# Patient Record
Sex: FEMALE | Race: WHITE | NOT HISPANIC OR LATINO | URBAN - METROPOLITAN AREA
[De-identification: names, ages, dates, MRNs, and addresses within clinical notes are randomized per-mention and may not be internally consistent; named-entity substitution may affect disease eponyms.]

---

## 2017-03-04 ENCOUNTER — EMERGENCY (EMERGENCY)
Facility: HOSPITAL | Age: 82
LOS: 1 days | Discharge: HOME | End: 2017-03-04
Attending: EMERGENCY MEDICINE | Admitting: EMERGENCY MEDICINE
Payer: MEDICARE

## 2017-03-04 VITALS
TEMPERATURE: 98 F | SYSTOLIC BLOOD PRESSURE: 177 MMHG | HEART RATE: 60 BPM | DIASTOLIC BLOOD PRESSURE: 68 MMHG | RESPIRATION RATE: 18 BRPM | OXYGEN SATURATION: 95 %

## 2017-03-04 DIAGNOSIS — M79.672 PAIN IN LEFT FOOT: ICD-10-CM
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
## 2017-03-04 DIAGNOSIS — S92.352A DISPLACED FRACTURE OF FIFTH METATARSAL BONE, LEFT FOOT, INITIAL ENCOUNTER FOR CLOSED FRACTURE: ICD-10-CM
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
## 2017-03-04 DIAGNOSIS — I10 ESSENTIAL (PRIMARY) HYPERTENSION: ICD-10-CM

## 2017-03-04 DIAGNOSIS — R07.81 PLEURODYNIA: ICD-10-CM

## 2017-03-04 DIAGNOSIS — J45.909 UNSPECIFIED ASTHMA, UNCOMPLICATED: ICD-10-CM
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
## 2017-03-04 DIAGNOSIS — S92.342A DISPLACED FRACTURE OF FOURTH METATARSAL BONE, LEFT FOOT, INITIAL ENCOUNTER FOR CLOSED FRACTURE: ICD-10-CM

## 2017-03-04 PROCEDURE — 73620 X-RAY EXAM OF FOOT: CPT | Mod: 26,LT

## 2017-03-04 PROCEDURE — 99284 EMERGENCY DEPT VISIT MOD MDM: CPT | Mod: 25

## 2017-03-04 PROCEDURE — 73620 X-RAY EXAM OF FOOT: CPT

## 2017-03-04 PROCEDURE — 28470 CLTX METATARSAL FX WO MNP EA: CPT | Mod: LT

## 2017-03-04 PROCEDURE — 70450 CT HEAD/BRAIN W/O DYE: CPT | Mod: 26

## 2017-03-04 PROCEDURE — 99284 EMERGENCY DEPT VISIT MOD MDM: CPT | Mod: GC

## 2017-03-04 PROCEDURE — 70450 CT HEAD/BRAIN W/O DYE: CPT

## 2017-03-04 PROCEDURE — 71250 CT THORAX DX C-: CPT | Mod: 26

## 2017-03-04 PROCEDURE — 71250 CT THORAX DX C-: CPT

## 2017-03-04 RX ORDER — ACETAMINOPHEN 500 MG
650 TABLET ORAL ONCE
Qty: 0 | Refills: 0 | Status: COMPLETED | OUTPATIENT
Start: 2017-03-04 | End: 2017-03-04

## 2017-03-04 RX ADMIN — Medication 650 MILLIGRAM(S): at 11:02

## 2017-03-04 NOTE — ED PROVIDER NOTE - MUSCULOSKELETAL, MLM
Spine appears normal, range of motion is not limited, tender in left lower posterior ribs and left fifth metacarpal

## 2017-03-04 NOTE — ED PROVIDER NOTE - MEDICAL DECISION MAKING DETAILS
89 yo female presenting after fall on AC; unremarkable neuro exam; will obtain ct head chest and xray of foot; DC if no signs of fracture 89 yo female presenting after fall on AC; unremarkable neuro exam; will obtain ct head chest and xray of foot; DC if no signs of fracture  ernesto -  s/p mechan fall yesterdaY no loc - ambulatory - CT HEAD AND CHEST R/O BLEED, R/O OCCULT PTX, RIB FX -- XR FOOT FOR FX, ANALGESIA AND REEEVAL

## 2017-03-04 NOTE — ED PROVIDER NOTE - PROGRESS NOTE DETAILS
initial declined pain meds now accepting tylenol p made aware of fx - discussed CT findings with patient and daughter of patient; notified to follow up with PCP and orthopedic surgeon. called assisted living pt w fx foot non wt bearing arrange for wheelchar and ortho f/u

## 2017-03-04 NOTE — ED PROVIDER NOTE - PLAN OF CARE
Follow up with your medical doctor in 2-3 days in regards to your abnormal finding found on CT scan.  It can be potentially something serious.  Please follow up with an orthopedic surgeon in 5-7 days in regards to your foot fracture.  A list of orthopedic surgeons will be given to you.     Take Tylenol 1g every six hours and supplement with ibuprofen 600mg, with food, every six hours which can be taken three hours apart from the Tylenol to have a layered effect.  Drink at least 2 Liters or 64 Ounces of water each day.  Return for any persistent, worsening symptoms, or ANY concerns at all.

## 2017-03-04 NOTE — ED PROVIDER NOTE - SKIN, MLM
no signs of bruising seen in the chest or back, ecchymosis/bruise seen over the fifth metacarpal of left foot, cap refill <2 seconds, PT pulse palpable

## 2017-03-04 NOTE — ED PROCEDURE NOTE - NS ED PERI VASCULAR NEG
no cyanosis of extremity/no paresthesia/no swelling/fingers/toes warm to touch/capillary refill time < 2 seconds

## 2017-03-04 NOTE — ED PROVIDER NOTE - CARE PLAN
Principal Discharge DX:	Fall Principal Discharge DX:	Fall  Instructions for follow-up, activity and diet:	Follow up with your medical doctor in 2-3 days in regards to your abnormal finding found on CT scan.  It can be potentially something serious.  Please follow up with an orthopedic surgeon in 5-7 days in regards to your foot fracture.  A list of orthopedic surgeons will be given to you.     Take Tylenol 1g every six hours and supplement with ibuprofen 600mg, with food, every six hours which can be taken three hours apart from the Tylenol to have a layered effect.  Drink at least 2 Liters or 64 Ounces of water each day.  Return for any persistent, worsening symptoms, or ANY concerns at all.

## 2017-03-04 NOTE — ED ADULT NURSE NOTE - OBJECTIVE STATEMENT
89 y/o F, reported to ED from assisted living. A&Ox2 (person and place), s/p fall. EMS reports that pt had a witnessed fall last night at 22:00. Pt's daughter reports that she spoke with the pt after the fall and she said that she was fine. Daughter reports that the fall was unwitnessed and that the aide saw the pt getting off of the floor. Pt denies LOC or head trauma. Pt reports that she was walking when she probably tripped over her cane. Pt has a hx of dementia and is unable to recall the exact order of events but stats "I trip and fall frequently." Daughter reports that pt was complaining of some left upper back pain this morning and foot pain. Pt reports some right scapula pain currently. Pt has ecchymosis on the top of her left foot and left elbow. Pt had full ROM in the left foot and arm. Pt was able to walk after the fall and walked from EMS stretcher to hospital bed upon arrival. Pt reports minimal pain at this time. Pt denies SOB, C/P, N/V/D, abd pain, fever or chills. Pt has a hx of dementia, asthma, dyslipidemia and triple bypass 15 years ago. Pt was recently started on Eliquis as per daughter. Daughter at bedside, will continue to monitor pt.

## 2017-03-06 ENCOUNTER — APPOINTMENT (OUTPATIENT)
Dept: ORTHOPEDIC SURGERY | Facility: CLINIC | Age: 82
End: 2017-03-06

## 2017-03-06 PROBLEM — Z00.00 ENCOUNTER FOR PREVENTIVE HEALTH EXAMINATION: Status: ACTIVE | Noted: 2017-03-06

## 2017-03-20 ENCOUNTER — APPOINTMENT (OUTPATIENT)
Dept: ORTHOPEDIC SURGERY | Facility: CLINIC | Age: 82
End: 2017-03-20

## 2017-03-20 DIAGNOSIS — Z87.09 PERSONAL HISTORY OF OTHER DISEASES OF THE RESPIRATORY SYSTEM: ICD-10-CM

## 2017-03-20 DIAGNOSIS — Z78.9 OTHER SPECIFIED HEALTH STATUS: ICD-10-CM

## 2017-04-03 ENCOUNTER — APPOINTMENT (OUTPATIENT)
Dept: ORTHOPEDIC SURGERY | Facility: CLINIC | Age: 82
End: 2017-04-03

## 2017-05-15 ENCOUNTER — APPOINTMENT (OUTPATIENT)
Dept: ORTHOPEDIC SURGERY | Facility: CLINIC | Age: 82
End: 2017-05-15

## 2017-05-15 DIAGNOSIS — S92.345D NONDISPLACED FRACTURE OF FOURTH METATARSAL BONE, LEFT FOOT, SUBSEQUENT ENCOUNTER FOR FRACTURE WITH ROUTINE HEALING: ICD-10-CM

## 2017-05-15 DIAGNOSIS — S92.355D NONDISPLACED FRACTURE OF FIFTH METATARSAL BONE, LEFT FOOT, SUBSEQUENT ENCOUNTER FOR FRACTURE WITH ROUTINE HEALING: ICD-10-CM

## 2017-05-15 RX ORDER — DILTIAZEM HYDROCHLORIDE 240 MG/1
240 CAPSULE, EXTENDED RELEASE ORAL
Qty: 90 | Refills: 0 | Status: ACTIVE | COMMUNITY
Start: 2017-01-26

## 2017-05-15 RX ORDER — DILTIAZEM HYDROCHLORIDE 360 MG/1
360 CAPSULE, COATED, EXTENDED RELEASE ORAL
Qty: 90 | Refills: 0 | Status: ACTIVE | COMMUNITY
Start: 2016-11-28

## 2017-05-15 RX ORDER — APIXABAN 2.5 MG/1
2.5 TABLET, FILM COATED ORAL
Qty: 180 | Refills: 0 | Status: ACTIVE | COMMUNITY
Start: 2017-02-08

## 2017-05-15 RX ORDER — ESCITALOPRAM OXALATE 10 MG/1
10 TABLET ORAL
Qty: 90 | Refills: 0 | Status: ACTIVE | COMMUNITY
Start: 2016-11-28

## 2017-05-15 RX ORDER — TRIAMTERENE AND HYDROCHLOROTHIAZIDE 25; 37.5 MG/1; MG/1
37.5-25 TABLET ORAL
Qty: 15 | Refills: 0 | Status: ACTIVE | COMMUNITY
Start: 2016-11-26

## 2017-05-15 RX ORDER — EZETIMIBE AND SIMVASTATIN 10; 20 MG/1; MG/1
10-20 TABLET ORAL
Qty: 45 | Refills: 0 | Status: ACTIVE | COMMUNITY
Start: 2016-11-28

## 2017-05-15 RX ORDER — LOSARTAN POTASSIUM AND HYDROCHLOROTHIAZIDE 12.5; 1 MG/1; MG/1
100-12.5 TABLET ORAL
Qty: 90 | Refills: 0 | Status: ACTIVE | COMMUNITY
Start: 2016-11-28

## 2018-01-04 ENCOUNTER — EMERGENCY (EMERGENCY)
Facility: HOSPITAL | Age: 83
LOS: 1 days | Discharge: HOME | End: 2018-01-04
Attending: EMERGENCY MEDICINE | Admitting: EMERGENCY MEDICINE
Payer: MEDICARE

## 2018-01-04 VITALS
DIASTOLIC BLOOD PRESSURE: 79 MMHG | RESPIRATION RATE: 63 BRPM | OXYGEN SATURATION: 97 % | SYSTOLIC BLOOD PRESSURE: 173 MMHG

## 2018-01-04 VITALS
OXYGEN SATURATION: 97 % | RESPIRATION RATE: 18 BRPM | SYSTOLIC BLOOD PRESSURE: 209 MMHG | HEART RATE: 63 BPM | DIASTOLIC BLOOD PRESSURE: 62 MMHG

## 2018-01-04 LAB
ALBUMIN SERPL ELPH-MCNC: 4.1 G/DL — SIGNIFICANT CHANGE UP (ref 3.3–5)
ALP SERPL-CCNC: 66 U/L — SIGNIFICANT CHANGE UP (ref 40–120)
ALT FLD-CCNC: 14 U/L RC — SIGNIFICANT CHANGE UP (ref 10–45)
ANION GAP SERPL CALC-SCNC: 9 MMOL/L — SIGNIFICANT CHANGE UP (ref 5–17)
APPEARANCE UR: CLEAR — SIGNIFICANT CHANGE UP
AST SERPL-CCNC: 15 U/L — SIGNIFICANT CHANGE UP (ref 10–40)
BASOPHILS # BLD AUTO: 0.1 K/UL — SIGNIFICANT CHANGE UP (ref 0–0.2)
BASOPHILS NFR BLD AUTO: 0.9 % — SIGNIFICANT CHANGE UP (ref 0–2)
BILIRUB SERPL-MCNC: 0.3 MG/DL — SIGNIFICANT CHANGE UP (ref 0.2–1.2)
BILIRUB UR-MCNC: NEGATIVE — SIGNIFICANT CHANGE UP
BUN SERPL-MCNC: 18 MG/DL — SIGNIFICANT CHANGE UP (ref 7–23)
CALCIUM SERPL-MCNC: 9.5 MG/DL — SIGNIFICANT CHANGE UP (ref 8.4–10.5)
CHLORIDE SERPL-SCNC: 98 MMOL/L — SIGNIFICANT CHANGE UP (ref 96–108)
CO2 SERPL-SCNC: 31 MMOL/L — SIGNIFICANT CHANGE UP (ref 22–31)
COLOR SPEC: YELLOW — SIGNIFICANT CHANGE UP
COMMENT - URINE: SIGNIFICANT CHANGE UP
CREAT SERPL-MCNC: 0.91 MG/DL — SIGNIFICANT CHANGE UP (ref 0.5–1.3)
DIFF PNL FLD: NEGATIVE — SIGNIFICANT CHANGE UP
EOSINOPHIL # BLD AUTO: 0.2 K/UL — SIGNIFICANT CHANGE UP (ref 0–0.5)
EOSINOPHIL NFR BLD AUTO: 2.7 % — SIGNIFICANT CHANGE UP (ref 0–6)
GLUCOSE SERPL-MCNC: 92 MG/DL — SIGNIFICANT CHANGE UP (ref 70–99)
GLUCOSE UR QL: NEGATIVE — SIGNIFICANT CHANGE UP
HCT VFR BLD CALC: 36 % — SIGNIFICANT CHANGE UP (ref 34.5–45)
HGB BLD-MCNC: 12.3 G/DL — SIGNIFICANT CHANGE UP (ref 11.5–15.5)
KETONES UR-MCNC: NEGATIVE — SIGNIFICANT CHANGE UP
LEUKOCYTE ESTERASE UR-ACNC: NEGATIVE — SIGNIFICANT CHANGE UP
LYMPHOCYTES # BLD AUTO: 1.8 K/UL — SIGNIFICANT CHANGE UP (ref 1–3.3)
LYMPHOCYTES # BLD AUTO: 20.4 % — SIGNIFICANT CHANGE UP (ref 13–44)
MCHC RBC-ENTMCNC: 31.7 PG — SIGNIFICANT CHANGE UP (ref 27–34)
MCHC RBC-ENTMCNC: 34.1 GM/DL — SIGNIFICANT CHANGE UP (ref 32–36)
MCV RBC AUTO: 92.8 FL — SIGNIFICANT CHANGE UP (ref 80–100)
MONOCYTES # BLD AUTO: 0.6 K/UL — SIGNIFICANT CHANGE UP (ref 0–0.9)
MONOCYTES NFR BLD AUTO: 6.7 % — SIGNIFICANT CHANGE UP (ref 2–14)
NEUTROPHILS # BLD AUTO: 6 K/UL — SIGNIFICANT CHANGE UP (ref 1.8–7.4)
NEUTROPHILS NFR BLD AUTO: 69.3 % — SIGNIFICANT CHANGE UP (ref 43–77)
NITRITE UR-MCNC: NEGATIVE — SIGNIFICANT CHANGE UP
PH UR: 7 — SIGNIFICANT CHANGE UP (ref 5–8)
PLATELET # BLD AUTO: 223 K/UL — SIGNIFICANT CHANGE UP (ref 150–400)
POTASSIUM SERPL-MCNC: 3.5 MMOL/L — SIGNIFICANT CHANGE UP (ref 3.5–5.3)
POTASSIUM SERPL-SCNC: 3.5 MMOL/L — SIGNIFICANT CHANGE UP (ref 3.5–5.3)
PROT SERPL-MCNC: 7.5 G/DL — SIGNIFICANT CHANGE UP (ref 6–8.3)
PROT UR-MCNC: 30 MG/DL
RBC # BLD: 3.88 M/UL — SIGNIFICANT CHANGE UP (ref 3.8–5.2)
RBC # FLD: 12 % — SIGNIFICANT CHANGE UP (ref 10.3–14.5)
RBC CASTS # UR COMP ASSIST: SIGNIFICANT CHANGE UP /HPF (ref 0–2)
SODIUM SERPL-SCNC: 138 MMOL/L — SIGNIFICANT CHANGE UP (ref 135–145)
SP GR SPEC: 1.01 — SIGNIFICANT CHANGE UP (ref 1.01–1.02)
UROBILINOGEN FLD QL: NEGATIVE — SIGNIFICANT CHANGE UP
WBC # BLD: 8.6 K/UL — SIGNIFICANT CHANGE UP (ref 3.8–10.5)
WBC # FLD AUTO: 8.6 K/UL — SIGNIFICANT CHANGE UP (ref 3.8–10.5)
WBC UR QL: SIGNIFICANT CHANGE UP /HPF (ref 0–5)

## 2018-01-04 PROCEDURE — 99284 EMERGENCY DEPT VISIT MOD MDM: CPT | Mod: 25

## 2018-01-04 PROCEDURE — 93005 ELECTROCARDIOGRAM TRACING: CPT

## 2018-01-04 PROCEDURE — 70450 CT HEAD/BRAIN W/O DYE: CPT | Mod: 26

## 2018-01-04 PROCEDURE — 87086 URINE CULTURE/COLONY COUNT: CPT

## 2018-01-04 PROCEDURE — 70450 CT HEAD/BRAIN W/O DYE: CPT

## 2018-01-04 PROCEDURE — 80053 COMPREHEN METABOLIC PANEL: CPT

## 2018-01-04 PROCEDURE — 85027 COMPLETE CBC AUTOMATED: CPT

## 2018-01-04 PROCEDURE — 99284 EMERGENCY DEPT VISIT MOD MDM: CPT | Mod: GC

## 2018-01-04 PROCEDURE — 81001 URINALYSIS AUTO W/SCOPE: CPT

## 2018-01-04 RX ORDER — DILTIAZEM HCL 120 MG
240 CAPSULE, EXT RELEASE 24 HR ORAL ONCE
Qty: 0 | Refills: 0 | Status: COMPLETED | OUTPATIENT
Start: 2018-01-04 | End: 2018-01-04

## 2018-01-04 RX ORDER — HYDROCHLOROTHIAZIDE 25 MG
12.5 TABLET ORAL ONCE
Qty: 0 | Refills: 0 | Status: COMPLETED | OUTPATIENT
Start: 2018-01-04 | End: 2018-01-04

## 2018-01-04 RX ORDER — LOSARTAN POTASSIUM 100 MG/1
100 TABLET, FILM COATED ORAL ONCE
Qty: 0 | Refills: 0 | Status: COMPLETED | OUTPATIENT
Start: 2018-01-04 | End: 2018-01-04

## 2018-01-04 RX ADMIN — LOSARTAN POTASSIUM 100 MILLIGRAM(S): 100 TABLET, FILM COATED ORAL at 12:02

## 2018-01-04 RX ADMIN — Medication 240 MILLIGRAM(S): at 12:02

## 2018-01-04 RX ADMIN — Medication 12.5 MILLIGRAM(S): at 12:02

## 2018-01-04 NOTE — ED ADULT NURSE REASSESSMENT NOTE - NS ED NURSE REASSESS COMMENT FT1
1100 family visiting. Elevated BP. AM meds were not given at [facility] this AM.. will give losartan, hctz and Cardizem xt in ER. 1214 report to [facility] nurse April

## 2018-01-04 NOTE — ED PROVIDER NOTE - MEDICAL DECISION MAKING DETAILS
ernesto- sp fall witness at [facility] with no loc on eloquis pt w/o complaints aox2 - will caqll sister to verify mental stsatus ck ua ct head r/o ich ck ekg and lytes if neg dc back to [facility]

## 2018-01-04 NOTE — ED PROVIDER NOTE - CARE PLAN
Principal Discharge DX:	Injury of head, initial encounter  Secondary Diagnosis:	Closed head injury, initial encounter Principal Discharge DX:	Injury of head, initial encounter  Instructions for follow-up, activity and diet:	1. You were seen for a fall. Your head CT scan showed no bleed.   2. Use your walker and stay hydrated.  3. Follow up with your primary care doctor within 48 hours.   4. Return immediately to the emergency department for new, persistent, or worsening symptoms or signs. Return immediately to the emergency department if you have chest pain, shortness of breath, loss of consciousness, or pain.  1. For your health, you should not smoke or use drugs, and you should not drink alcohol in excess.  2. Also for your health, you should make healthy food choices and be physically active.    3. Resources for healthy recipes and food choices are:  a. https://www.[employer].gov/ten-tips-build-healthy-meal      b. https://www.drweil.com/diet-nutrition/recipes/  c. https://www.[facility].Cooks.[facility]/nutritionsource/recipes-2/home-cooking/  4. Resources for physical activity are:       a. https://www.[employer].org/programs/recreation/shape-up-[facility]      b. https://www.cdc.gov/physicalactivity/basics/index.htm  c. https://www.mayoclinic.org/healthy-lifestyle/fitness/basics/fitness-basics/hlv-08228315?kgSydb=99685598  d. https://jz8prbl.mayela.nih.gov/get-started  5. Resources for wellness are:  a. https://nywell.[facility].us/en/      b. https://www.[facility].org/patient-care/wheel-of-health/  c. http://shreya.[facility]/mindful-meditations  Secondary Diagnosis:	Closed head injury, initial encounter

## 2018-01-04 NOTE — ED PROVIDER NOTE - PHYSICAL EXAMINATION
ernesto aaaox2 nad ncat perrl s1s2 rrrctabl abd soft nt stregth 5/5 ue le bl sensation inatct pelv stable no shortening or rotation cn2-12intCT ernesto aaaox2 nad ncat perrl s1s2 rrrctabl abd soft nt stregth 5/5 ue le bl sensation inatct pelv stable no shortening or rotation cn2-12intCT    Marleny Sonenthal R1  GENERAL: Well appearing, well nourished, awake, alert and in NAD  head: NC/AT  ENMT: Airway patent, Nasal mucosa clear. Mouth with MMM. Throat has no vesicles, no oropharyngeal exudates and uvula is midline.  EYES: conjunctiva clear, sclera anicteric, PERRL  CARDIAC: Regular rate, regular rhythm.  Heart sounds S1, S2, no S3, S4. No murmurs, rubs or gallops.  RESPIRATORY: Breath sounds clear and equal in bilateral anterior lung fields, no wheezes/ronchi/stridor; pt breathing and speaking comfortably with no increased WOB, no accessory mm. use, no intercostal retractions, no nasal flaring  GI: no ecchymosis, erythema, or lacerations, abdomen soft, non-distended, non-tender, no rebound or guarding.   NEURO: pupils 2 mm, PERRL, EOMI (CN III, IV, VI), facial sensation intact to light touch in all 3 divisions bilat (CN V), face is symmetric with normal eye closure, eye opening, and smile (CN VII), hearing is normal to rubbing fingers (CN VII), palate elevates symmetrically, phonation is normal (CN IX, X),  shoulder shrug intact bilat (CN XI), tongue is midline with nl movements and no atrophy (CN XII), finger to nose test nl bilat, negative pronator drift bilat, speech is clear; 5/5 motor strength BUE and BLE: deltoids, biceps, triceps, wrist flexors/extensors, hand , hip flexors, knee flexors/extensors, plantar/dorsiflexors, hallux flexors/extensors; sensation intact to light touch BUE and BLE: C5-T1 and L3-S1

## 2018-01-04 NOTE — ED ADULT NURSE NOTE - OBJECTIVE STATEMENT
0742 89 yr old wf brought to ER via ambulance on stretcher from nursing home for further eval and tx. S/P tripped and fell this AM per EMS. No LOC. A&Ox2. Color pink. skin W&D. Lungs clear. abd soft. MAEx4. Denies chest pain, palp, dizziness or SOB. Not sure why she is in the ER and does not know what year it is. Repetitive question "Are you going to give me copies of my records. States she lives with her mother. Pt is on Eliquis per EMS

## 2018-01-04 NOTE — ED ADULT NURSE NOTE - PAIN RATING/NUMBER SCALE (0-10): REST
Concern of worsening dementia with the oxycodone, dc'ed oxycodone, on tramadol for pain management  Physical therapy, occupational therapy  Additional nonnarcotic pain medication such as Lidoderm patch and Neurontin   4

## 2018-01-04 NOTE — ED PROVIDER NOTE - PLAN OF CARE
1. You were seen for a fall. Your head CT scan showed no bleed.   2. Use your walker and stay hydrated.  3. Follow up with your primary care doctor within 48 hours.   4. Return immediately to the emergency department for new, persistent, or worsening symptoms or signs. Return immediately to the emergency department if you have chest pain, shortness of breath, loss of consciousness, or pain.  1. For your health, you should not smoke or use drugs, and you should not drink alcohol in excess.  2. Also for your health, you should make healthy food choices and be physically active.    3. Resources for healthy recipes and food choices are:  a. https://www.[employer].gov/ten-tips-build-healthy-meal      b. https://www.drweil.com/diet-nutrition/recipes/  c. https://www.[facility] [place].[facility]/nutritionsource/recipes-2/home-cooking/  4. Resources for physical activity are:       a. https://www.[employer].org/programs/recreation/shape-up-[facility]      b. https://www.cdc.gov/physicalactivity/basics/index.htm  c. https://www.mayoclinic.org/healthy-lifestyle/fitness/basics/fitness-basics/hlv-36040387?lyIlvo=93557823  d. https://yu3igkx.mayela.nih.gov/get-started  5. Resources for wellness are:  a. https://[facility] Hospital[facility].us/en/      b. https://www.[facility].org/patient-care/wheel-of-health/  c. http://shreya.[facility]/mindful-meditations

## 2018-01-04 NOTE — ED PROVIDER NOTE - OBJECTIVE STATEMENT
VERITO 89 F HO ANXIETY DEPRESSION, HYPERLIPIDEMIA, on eloquis present to ed sp witnessed fall at [facility] - no loc - ambulatory after fall sent in f=or eval , pt with no complaints unsure of why she fell VERITO 89 F HO ANXIETY DEPRESSION, HYPERLIPIDEMIA, on eloquis present to ed sp witnessed fall at [facility] - no loc - ambulatory after fall sent in f=or eval , pt with no complaints unsure of why she fell, pt denies ha no fever no lightheadedness or dizziness

## 2018-01-04 NOTE — ED PROVIDER NOTE - PROGRESS NOTE DETAILS
dw family pt at baseline mental status they will bring her back to [facility] pt's BP elevated to 200s SBP, pt did not receive home meds at [facility] this AM. home BP meds ordered here pt bp elevated didn't take home bp meds this am - no complaints po meds and dc will fu bp at [facility]

## 2018-01-05 LAB
CULTURE RESULTS: NO GROWTH — SIGNIFICANT CHANGE UP
SPECIMEN SOURCE: SIGNIFICANT CHANGE UP

## 2018-01-19 ENCOUNTER — INPATIENT (INPATIENT)
Facility: HOSPITAL | Age: 83
LOS: 1 days | Discharge: HOME | DRG: 202 | End: 2018-01-21
Attending: INTERNAL MEDICINE | Admitting: INTERNAL MEDICINE
Payer: MEDICARE

## 2018-01-19 VITALS
SYSTOLIC BLOOD PRESSURE: 197 MMHG | RESPIRATION RATE: 18 BRPM | HEART RATE: 62 BPM | DIASTOLIC BLOOD PRESSURE: 81 MMHG | OXYGEN SATURATION: 96 %

## 2018-01-19 LAB
APPEARANCE UR: CLEAR — SIGNIFICANT CHANGE UP
BASOPHILS # BLD AUTO: 0.1 K/UL — SIGNIFICANT CHANGE UP (ref 0–0.2)
BASOPHILS NFR BLD AUTO: 1.1 % — SIGNIFICANT CHANGE UP (ref 0–2)
BILIRUB UR-MCNC: NEGATIVE — SIGNIFICANT CHANGE UP
CK MB CFR SERPL CALC: 1.7 NG/ML — SIGNIFICANT CHANGE UP (ref 0–3.8)
CK SERPL-CCNC: 28 U/L — SIGNIFICANT CHANGE UP (ref 25–170)
COLOR SPEC: SIGNIFICANT CHANGE UP
DIFF PNL FLD: NEGATIVE — SIGNIFICANT CHANGE UP
EOSINOPHIL # BLD AUTO: 0.3 K/UL — SIGNIFICANT CHANGE UP (ref 0–0.5)
EOSINOPHIL NFR BLD AUTO: 3.2 % — SIGNIFICANT CHANGE UP (ref 0–6)
EPI CELLS # UR: SIGNIFICANT CHANGE UP /HPF
GAS PNL BLDV: SIGNIFICANT CHANGE UP
GLUCOSE UR QL: NEGATIVE — SIGNIFICANT CHANGE UP
HCT VFR BLD CALC: 35.8 % — SIGNIFICANT CHANGE UP (ref 34.5–45)
HGB BLD-MCNC: 11.9 G/DL — SIGNIFICANT CHANGE UP (ref 11.5–15.5)
KETONES UR-MCNC: NEGATIVE — SIGNIFICANT CHANGE UP
LEUKOCYTE ESTERASE UR-ACNC: ABNORMAL
LYMPHOCYTES # BLD AUTO: 2.3 K/UL — SIGNIFICANT CHANGE UP (ref 1–3.3)
LYMPHOCYTES # BLD AUTO: 24.8 % — SIGNIFICANT CHANGE UP (ref 13–44)
MCHC RBC-ENTMCNC: 30.5 PG — SIGNIFICANT CHANGE UP (ref 27–34)
MCHC RBC-ENTMCNC: 33.3 GM/DL — SIGNIFICANT CHANGE UP (ref 32–36)
MCV RBC AUTO: 91.6 FL — SIGNIFICANT CHANGE UP (ref 80–100)
MONOCYTES # BLD AUTO: 0.7 K/UL — SIGNIFICANT CHANGE UP (ref 0–0.9)
MONOCYTES NFR BLD AUTO: 7.6 % — SIGNIFICANT CHANGE UP (ref 2–14)
NEUTROPHILS # BLD AUTO: 5.8 K/UL — SIGNIFICANT CHANGE UP (ref 1.8–7.4)
NEUTROPHILS NFR BLD AUTO: 63.4 % — SIGNIFICANT CHANGE UP (ref 43–77)
NITRITE UR-MCNC: NEGATIVE — SIGNIFICANT CHANGE UP
PH UR: 6.5 — SIGNIFICANT CHANGE UP (ref 5–8)
PLATELET # BLD AUTO: 223 K/UL — SIGNIFICANT CHANGE UP (ref 150–400)
PROT UR-MCNC: NEGATIVE — SIGNIFICANT CHANGE UP
RBC # BLD: 3.91 M/UL — SIGNIFICANT CHANGE UP (ref 3.8–5.2)
RBC # FLD: 11.9 % — SIGNIFICANT CHANGE UP (ref 10.3–14.5)
RBC CASTS # UR COMP ASSIST: SIGNIFICANT CHANGE UP /HPF (ref 0–2)
SP GR SPEC: 1.01 — SIGNIFICANT CHANGE UP (ref 1.01–1.02)
TROPONIN T SERPL-MCNC: <0.01 NG/ML — SIGNIFICANT CHANGE UP (ref 0–0.06)
UROBILINOGEN FLD QL: NEGATIVE — SIGNIFICANT CHANGE UP
WBC # BLD: 9.1 K/UL — SIGNIFICANT CHANGE UP (ref 3.8–10.5)
WBC # FLD AUTO: 9.1 K/UL — SIGNIFICANT CHANGE UP (ref 3.8–10.5)
WBC UR QL: SIGNIFICANT CHANGE UP /HPF (ref 0–5)

## 2018-01-19 PROCEDURE — 99285 EMERGENCY DEPT VISIT HI MDM: CPT | Mod: 25,GC

## 2018-01-19 PROCEDURE — 71046 X-RAY EXAM CHEST 2 VIEWS: CPT | Mod: 26

## 2018-01-19 PROCEDURE — 93010 ELECTROCARDIOGRAM REPORT: CPT

## 2018-01-19 RX ORDER — IPRATROPIUM/ALBUTEROL SULFATE 18-103MCG
3 AEROSOL WITH ADAPTER (GRAM) INHALATION ONCE
Qty: 0 | Refills: 0 | Status: COMPLETED | OUTPATIENT
Start: 2018-01-19 | End: 2018-01-19

## 2018-01-19 RX ORDER — SODIUM CHLORIDE 9 MG/ML
500 INJECTION INTRAMUSCULAR; INTRAVENOUS; SUBCUTANEOUS ONCE
Qty: 0 | Refills: 0 | Status: COMPLETED | OUTPATIENT
Start: 2018-01-19 | End: 2018-01-19

## 2018-01-19 RX ADMIN — Medication 3 MILLILITER(S): at 19:59

## 2018-01-19 RX ADMIN — SODIUM CHLORIDE 500 MILLILITER(S): 9 INJECTION INTRAMUSCULAR; INTRAVENOUS; SUBCUTANEOUS at 19:53

## 2018-01-19 RX ADMIN — Medication 3 MILLILITER(S): at 20:00

## 2018-01-19 RX ADMIN — Medication 125 MILLIGRAM(S): at 19:59

## 2018-01-19 NOTE — ED PROVIDER NOTE - OBJECTIVE STATEMENT
89yo female pmh asthma, HLD, CAD s/p CABG, HTN, PPM on Eliquis sent from [facility] p/w difficulty breathing not improving with albuterol. Used inhaler more than normal over past several months, unsure of exactly how often.   Denies fevers, n/v/d, chest pain, dysuria, abd pain, no change in PO status, recent travel, LE swelling or pain, rashes.   Baseline AAOx2. Daughter states she is at mental status baseline, cough, falls, head trauma.   NKDA. Ambulates with walker.  PCP - Dr Tanisha Reyes 89yo female pmh asthma, HLD, CAD s/p CABG, HTN, PPM on Eliquis sent from [facility] BIBEMS difficulty breathing one hour ago not improving with albuterol. Used inhaler more than normal over past several months, unsure of exactly how often.   Denies fevers, n/v/d, chest pain, dysuria, abd pain, no change in PO status, recent travel, LE swelling or pain, rashes.   Baseline AAOx2. Daughter states she is at mental status baseline, cough, falls, head trauma.   NKDA. Ambulates with walker.  PCP - Dr Tanisha Reyes    Significant past medical hx/surgical hx/social hx and review of systems can be found above in the history of present illness.

## 2018-01-19 NOTE — ED ADULT NURSE NOTE - OBJECTIVE STATEMENT
89 yo female with PMH dementia, asthma brought to ED by EMS from [facility] assisted living for wheezes. Patient's daughter at bedside states that assisted living staff said that patient was wheezing yesterday and today without improvement following albuterol nebulizer, so they sent her to ED. Patient denies chest pain, cough, fever, n/v/d, abdominal pain. At present, patient is A&Ox1, confused, smiling. At baseline mental status as per daughter. +Audible wheezes bilaterally. Patient respirations are unlabored. Abdomen is soft, nondistended, nontender to palpation.

## 2018-01-19 NOTE — ED ADULT NURSE NOTE - PMH
Anxiety    ASHD (arteriosclerotic heart disease)    Asthma    CAD (coronary artery disease)    Hyperlipidemia    Hypertension

## 2018-01-19 NOTE — ED PROVIDER NOTE - MEDICAL DECISION MAKING DETAILS
89F CAD, PPM on eliquis, asthma with difficulty breathing. Triage note states AMS although patient and daughter deny this and state pt is at baseline. Scattered wheezing c/w asthma exacerbation. Duonebs and solu-medrol. CXR, EKG, cardiacs, labs 89F CAD, PPM on eliquis, asthma with difficulty breathing. Triage note states AMS although patient and daughter deny this and state pt is at baseline. Scattered wheezing c/w asthma exacerbation. Duonebs and solu-medrol. CXR, EKG, cardiacs, labs    Attending MD Joseph: 89 yo female with CAD, CABG, PPM, on Eliquis, asthma, p/w diff breathing, at baseline mental status.  Increase use of inhaler.  Patient denies fever, chills, nausea, vomiting, or diarrhea. Patient denies chest pain, palpitations, or diaphoresis.  Attending MD Joseph: A & O x 2, NAD, HEENT WNL and no facial asymmetry; lungs scattered wheezing bibasialar, heart with reg rhythm without murmur; abdomen soft NTND; extremities with no edema; skin with no rashes, neuro exam non focal with no motor or sensory deficits.  Likely asthma exacerbation, but will rule out ACS

## 2018-01-19 NOTE — ED PROVIDER NOTE - PROGRESS NOTE DETAILS
Desaturation to 91% on room air while ambulating with wheezing. Will admit to hospitalist for asthma exacerbation

## 2018-01-19 NOTE — ED PROVIDER NOTE - ATTENDING CONTRIBUTION TO CARE
Attending MD Joseph:  I personally have seen and examined this patient.  Resident note reviewed and agree on plan of care and except where noted.  See MDM for details.

## 2018-01-20 DIAGNOSIS — I48.0 PAROXYSMAL ATRIAL FIBRILLATION: ICD-10-CM

## 2018-01-20 DIAGNOSIS — Z29.9 ENCOUNTER FOR PROPHYLACTIC MEASURES, UNSPECIFIED: ICD-10-CM

## 2018-01-20 DIAGNOSIS — I10 ESSENTIAL (PRIMARY) HYPERTENSION: ICD-10-CM

## 2018-01-20 DIAGNOSIS — K63.5 POLYP OF COLON: Chronic | ICD-10-CM

## 2018-01-20 DIAGNOSIS — E78.5 HYPERLIPIDEMIA, UNSPECIFIED: ICD-10-CM

## 2018-01-20 DIAGNOSIS — J45.21 MILD INTERMITTENT ASTHMA WITH (ACUTE) EXACERBATION: ICD-10-CM

## 2018-01-20 DIAGNOSIS — F41.9 ANXIETY DISORDER, UNSPECIFIED: ICD-10-CM

## 2018-01-20 DIAGNOSIS — I48.91 UNSPECIFIED ATRIAL FIBRILLATION: ICD-10-CM

## 2018-01-20 DIAGNOSIS — I25.10 ATHEROSCLEROTIC HEART DISEASE OF NATIVE CORONARY ARTERY WITHOUT ANGINA PECTORIS: ICD-10-CM

## 2018-01-20 DIAGNOSIS — R06.03 ACUTE RESPIRATORY DISTRESS: ICD-10-CM

## 2018-01-20 DIAGNOSIS — J96.01 ACUTE RESPIRATORY FAILURE WITH HYPOXIA: ICD-10-CM

## 2018-01-20 DIAGNOSIS — Z95.1 PRESENCE OF AORTOCORONARY BYPASS GRAFT: Chronic | ICD-10-CM

## 2018-01-20 DIAGNOSIS — Z95.0 PRESENCE OF CARDIAC PACEMAKER: Chronic | ICD-10-CM

## 2018-01-20 LAB
ALBUMIN SERPL ELPH-MCNC: 3.9 G/DL — SIGNIFICANT CHANGE UP (ref 3.3–5)
ALP SERPL-CCNC: 65 U/L — SIGNIFICANT CHANGE UP (ref 40–120)
ALT FLD-CCNC: 13 U/L RC — SIGNIFICANT CHANGE UP (ref 10–45)
ANION GAP SERPL CALC-SCNC: 16 MMOL/L — SIGNIFICANT CHANGE UP (ref 5–17)
APTT BLD: 31.3 SEC — SIGNIFICANT CHANGE UP (ref 27.5–37.4)
AST SERPL-CCNC: 15 U/L — SIGNIFICANT CHANGE UP (ref 10–40)
BASOPHILS # BLD AUTO: 0 K/UL — SIGNIFICANT CHANGE UP (ref 0–0.2)
BASOPHILS NFR BLD AUTO: 0 % — SIGNIFICANT CHANGE UP (ref 0–2)
BILIRUB SERPL-MCNC: 0.2 MG/DL — SIGNIFICANT CHANGE UP (ref 0.2–1.2)
BUN SERPL-MCNC: 26 MG/DL — HIGH (ref 7–23)
CALCIUM SERPL-MCNC: 9.6 MG/DL — SIGNIFICANT CHANGE UP (ref 8.4–10.5)
CHLORIDE SERPL-SCNC: 99 MMOL/L — SIGNIFICANT CHANGE UP (ref 96–108)
CO2 SERPL-SCNC: 23 MMOL/L — SIGNIFICANT CHANGE UP (ref 22–31)
CREAT SERPL-MCNC: 0.96 MG/DL — SIGNIFICANT CHANGE UP (ref 0.5–1.3)
CULTURE RESULTS: NO GROWTH — SIGNIFICANT CHANGE UP
EOSINOPHIL # BLD AUTO: 0 K/UL — SIGNIFICANT CHANGE UP (ref 0–0.5)
EOSINOPHIL NFR BLD AUTO: 0.1 % — SIGNIFICANT CHANGE UP (ref 0–6)
GAS PNL BLDV: SIGNIFICANT CHANGE UP
GLUCOSE SERPL-MCNC: 177 MG/DL — HIGH (ref 70–99)
HCT VFR BLD CALC: 34.6 % — SIGNIFICANT CHANGE UP (ref 34.5–45)
HGB BLD-MCNC: 11.3 G/DL — LOW (ref 11.5–15.5)
INR BLD: 1.2 RATIO — HIGH (ref 0.88–1.16)
LYMPHOCYTES # BLD AUTO: 0.7 K/UL — LOW (ref 1–3.3)
LYMPHOCYTES # BLD AUTO: 8.3 % — LOW (ref 13–44)
MAGNESIUM SERPL-MCNC: 2 MG/DL — SIGNIFICANT CHANGE UP (ref 1.6–2.6)
MCHC RBC-ENTMCNC: 29.8 PG — SIGNIFICANT CHANGE UP (ref 27–34)
MCHC RBC-ENTMCNC: 32.6 GM/DL — SIGNIFICANT CHANGE UP (ref 32–36)
MCV RBC AUTO: 91.3 FL — SIGNIFICANT CHANGE UP (ref 80–100)
MONOCYTES # BLD AUTO: 0 K/UL — SIGNIFICANT CHANGE UP (ref 0–0.9)
MONOCYTES NFR BLD AUTO: 0.2 % — LOW (ref 2–14)
NEUTROPHILS # BLD AUTO: 7.9 K/UL — HIGH (ref 1.8–7.4)
NEUTROPHILS NFR BLD AUTO: 91.4 % — HIGH (ref 43–77)
PHOSPHATE SERPL-MCNC: 3 MG/DL — SIGNIFICANT CHANGE UP (ref 2.5–4.5)
PLATELET # BLD AUTO: 218 K/UL — SIGNIFICANT CHANGE UP (ref 150–400)
POTASSIUM SERPL-MCNC: 3.4 MMOL/L — LOW (ref 3.5–5.3)
POTASSIUM SERPL-SCNC: 3.4 MMOL/L — LOW (ref 3.5–5.3)
PROT SERPL-MCNC: 7.4 G/DL — SIGNIFICANT CHANGE UP (ref 6–8.3)
PROTHROM AB SERPL-ACNC: 13 SEC — HIGH (ref 9.8–12.7)
RAPID RVP RESULT: SIGNIFICANT CHANGE UP
RBC # BLD: 3.79 M/UL — LOW (ref 3.8–5.2)
RBC # FLD: 11.9 % — SIGNIFICANT CHANGE UP (ref 10.3–14.5)
SODIUM SERPL-SCNC: 138 MMOL/L — SIGNIFICANT CHANGE UP (ref 135–145)
SPECIMEN SOURCE: SIGNIFICANT CHANGE UP
TROPONIN T SERPL-MCNC: <0.01 NG/ML — SIGNIFICANT CHANGE UP (ref 0–0.06)
WBC # BLD: 8.6 K/UL — SIGNIFICANT CHANGE UP (ref 3.8–10.5)
WBC # FLD AUTO: 8.6 K/UL — SIGNIFICANT CHANGE UP (ref 3.8–10.5)

## 2018-01-20 PROCEDURE — 12345: CPT | Mod: NC

## 2018-01-20 PROCEDURE — 99223 1ST HOSP IP/OBS HIGH 75: CPT | Mod: GC

## 2018-01-20 RX ORDER — ACETAMINOPHEN 500 MG
650 TABLET ORAL EVERY 6 HOURS
Qty: 0 | Refills: 0 | Status: DISCONTINUED | OUTPATIENT
Start: 2018-01-20 | End: 2018-01-21

## 2018-01-20 RX ORDER — ACETAMINOPHEN 500 MG
0 TABLET ORAL
Qty: 0 | Refills: 0 | COMMUNITY

## 2018-01-20 RX ORDER — ALBUTEROL 90 UG/1
2.5 AEROSOL, METERED ORAL ONCE
Qty: 0 | Refills: 0 | Status: COMPLETED | OUTPATIENT
Start: 2018-01-20 | End: 2018-01-20

## 2018-01-20 RX ORDER — SENNA PLUS 8.6 MG/1
2 TABLET ORAL AT BEDTIME
Qty: 0 | Refills: 0 | Status: DISCONTINUED | OUTPATIENT
Start: 2018-01-20 | End: 2018-01-21

## 2018-01-20 RX ORDER — EZETIMIBE AND SIMVASTATIN 10; 80 MG/1; MG/1
0 TABLET, FILM COATED ORAL
Qty: 0 | Refills: 0 | COMMUNITY

## 2018-01-20 RX ORDER — APIXABAN 2.5 MG/1
2.5 TABLET, FILM COATED ORAL EVERY 12 HOURS
Qty: 0 | Refills: 0 | Status: DISCONTINUED | OUTPATIENT
Start: 2018-01-20 | End: 2018-01-21

## 2018-01-20 RX ORDER — ESCITALOPRAM OXALATE 10 MG/1
0 TABLET, FILM COATED ORAL
Qty: 0 | Refills: 0 | COMMUNITY

## 2018-01-20 RX ORDER — HEPARIN SODIUM 5000 [USP'U]/ML
5000 INJECTION INTRAVENOUS; SUBCUTANEOUS EVERY 8 HOURS
Qty: 0 | Refills: 0 | Status: DISCONTINUED | OUTPATIENT
Start: 2018-01-20 | End: 2018-01-20

## 2018-01-20 RX ORDER — APIXABAN 2.5 MG/1
0 TABLET, FILM COATED ORAL
Qty: 0 | Refills: 0 | COMMUNITY

## 2018-01-20 RX ORDER — LOSARTAN/HYDROCHLOROTHIAZIDE 100MG-25MG
0 TABLET ORAL
Qty: 0 | Refills: 0 | COMMUNITY

## 2018-01-20 RX ORDER — DOCUSATE SODIUM 100 MG
100 CAPSULE ORAL THREE TIMES A DAY
Qty: 0 | Refills: 0 | Status: DISCONTINUED | OUTPATIENT
Start: 2018-01-20 | End: 2018-01-21

## 2018-01-20 RX ORDER — ESCITALOPRAM OXALATE 10 MG/1
10 TABLET, FILM COATED ORAL AT BEDTIME
Qty: 0 | Refills: 0 | Status: DISCONTINUED | OUTPATIENT
Start: 2018-01-20 | End: 2018-01-21

## 2018-01-20 RX ORDER — SIMVASTATIN 20 MG/1
20 TABLET, FILM COATED ORAL AT BEDTIME
Qty: 0 | Refills: 0 | Status: DISCONTINUED | OUTPATIENT
Start: 2018-01-20 | End: 2018-01-21

## 2018-01-20 RX ORDER — IPRATROPIUM/ALBUTEROL SULFATE 18-103MCG
3 AEROSOL WITH ADAPTER (GRAM) INHALATION EVERY 6 HOURS
Qty: 0 | Refills: 0 | Status: DISCONTINUED | OUTPATIENT
Start: 2018-01-20 | End: 2018-01-21

## 2018-01-20 RX ORDER — BENZOCAINE AND MENTHOL 5; 1 G/100ML; G/100ML
1 LIQUID ORAL THREE TIMES A DAY
Qty: 0 | Refills: 0 | Status: DISCONTINUED | OUTPATIENT
Start: 2018-01-20 | End: 2018-01-21

## 2018-01-20 RX ADMIN — APIXABAN 2.5 MILLIGRAM(S): 2.5 TABLET, FILM COATED ORAL at 05:48

## 2018-01-20 RX ADMIN — Medication 3 MILLILITER(S): at 05:50

## 2018-01-20 RX ADMIN — Medication 100 MILLIGRAM(S): at 15:44

## 2018-01-20 RX ADMIN — Medication 40 MILLIGRAM(S): at 07:39

## 2018-01-20 RX ADMIN — ALBUTEROL 2.5 MILLIGRAM(S): 90 AEROSOL, METERED ORAL at 04:37

## 2018-01-20 RX ADMIN — APIXABAN 2.5 MILLIGRAM(S): 2.5 TABLET, FILM COATED ORAL at 18:38

## 2018-01-20 RX ADMIN — ESCITALOPRAM OXALATE 10 MILLIGRAM(S): 10 TABLET, FILM COATED ORAL at 22:36

## 2018-01-20 RX ADMIN — Medication 100 MILLIGRAM(S): at 05:48

## 2018-01-20 RX ADMIN — SIMVASTATIN 20 MILLIGRAM(S): 20 TABLET, FILM COATED ORAL at 22:36

## 2018-01-20 RX ADMIN — Medication 1 TABLET(S): at 12:37

## 2018-01-20 RX ADMIN — Medication 3 MILLILITER(S): at 18:04

## 2018-01-20 RX ADMIN — Medication 3 MILLILITER(S): at 23:17

## 2018-01-20 RX ADMIN — Medication 3 MILLILITER(S): at 12:37

## 2018-01-20 NOTE — H&P ADULT - NSHPSOCIALHISTORY_GEN_ALL_CORE
Occupation:  Tobacco use:  Alcohol use:  Recreational drug use:  High-risk sexual activity: Occupation: housewife, from [place]  Tobacco use: denies hx  Alcohol use: denies hx  Recreational drug use: denies hx  Ambulatory with walker, in [facility]

## 2018-01-20 NOTE — H&P ADULT - ASSESSMENT
89 yo F w/PMHx asthma no intubations (mild intermittent), HLD, CAD s/p CABG X 3, HTN, PPM ([facility]), Afib on Eliquis sent from [facility] at 6PM yesterday 2/2 resp distress not improving w/albuterol, O2SAT 91% on RA, likely 2/2 asthma exacerbation.

## 2018-01-20 NOTE — H&P ADULT - PSH
No significant past surgical history Artificial pacemaker  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

## 2018-01-20 NOTE — H&P ADULT - PROBLEM SELECTOR PROBLEM 1
Mild intermittent asthma with exacerbation Respiratory distress, acute Acute respiratory failure with hypoxia

## 2018-01-20 NOTE — H&P ADULT - PROBLEM SELECTOR PLAN 4
-stable; c/w vytorin simvastatin equivalent -rate-controlled on EKG; on Eliquis  -c/w Eliquis 2.5 BID

## 2018-01-20 NOTE — H&P ADULT - PROBLEM SELECTOR PROBLEM 3
Hyperlipidemia Hypertension Coronary artery disease involving native coronary artery of native heart without angina pectoris

## 2018-01-20 NOTE — H&P ADULT - PROBLEM SELECTOR PLAN 3
-BP initially high, since improved to 140s systolic  -c/w dilTIAZem 240 mg daily home dosage -do not believe patient has atypical anginal symptom based on hx and exam  -s/p CABG  X 3, PPM  -c/w vytorin simvastatin equivalent 20 mg daily

## 2018-01-20 NOTE — H&P ADULT - PROBLEM SELECTOR PLAN 6
-ambulatory; consider encouraging ambulation vs. SQH -stable; c/w lexapro 10 home dose -rate-controlled on EKG; on Eliquis  -c/w Eliquis 2.5 BID -BP initially high, since improved to 140s systolic  -c/w dilTIAZem 240 mg daily home dosage

## 2018-01-20 NOTE — H&P ADULT - ATTENDING COMMENTS
Patient seen and examined at bedside.  Agree the resident note above. Changes & addendums were made to note above where appropriate.

## 2018-01-20 NOTE — H&P ADULT - HISTORY OF PRESENT ILLNESS
89yo female pmh asthma, HLD, CAD s/p CABG, HTN, PPM on Eliquis sent from [facility] BIBEMS difficulty breathing one hour ago not improving with albuterol. Used inhaler more than normal over past several months, unsure of exactly how often.   	Denies fevers, n/v/d, chest pain, dysuria, abd pain, no change in PO status, recent travel, LE swelling or pain, rashes.   	Baseline AAOx2. Daughter states she is at mental status baseline, cough, falls, head trauma.   	NKDA. Ambulates with walker.      In the ED:  T=36.6 HR=62 VV=022/81 (repeat 129/61) RR=18 Q4FIU=24% RA  s/p solumedrol 125, 1L NS, duonebsx3 with improvement 89 yo F w/PMHx asthma no intubations (mild intermittent), HLD, CAD s/p CABG X 3    89yo female pmh asthma, HLD, CAD s/p CABG, HTN, PPM on Eliquis sent from [facility] difficulty breathing one hour ago not improving with albuterol. Used inhaler more than normal over past several months, unsure of exactly how often.   	Denies fevers, n/v/d, chest pain, dysuria, abd pain, no change in PO status, recent travel, LE swelling or pain, rashes.   	Baseline AAOx2. Daughter states she is at mental status baseline, cough, falls, head trauma.   	NKDA. Ambulates with walker.      In the ED:  T=36.6 HR=62 WS=584/81 (repeat 129/61) RR=18 O2FRC=50% RA  s/p solumedrol 125, 1L NS, duonebsx3 with improvement 89 yo F w/PMHx asthma no intubations (mild intermittent), HLD, CAD s/p CABG X 3, HTN, PPM ([facility]), Afib on Eliquis sent from [facility] at 6PM yesterday 2/2 resp distress not improving w/albuterol, O2SAT 91% on RA. Pt daughter notes more RUIZ than usual, also seemed "confused" yesterday, saying things that didn't make sense. Denies F/C, orthopnea, CP, N/V, abdominal pain, cough, congestion, syncope, dizziness, dysuria/hematuria, change in BM, peripheral swelling/skin changes, myalgias. Denies sick contacts. Baseline AAOX2. Daughter states at mental status.    In the ED:  T=36.6 HR=62 GB=151/81 (repeat 129/61) RR=18 N8XMO=49% RA  s/p solumedrol 125, NS, duonebsx3 with improvement 89 yo F w/PMHx asthma no intubations (mild intermittent), HLD, CAD s/p CABG X 3, HTN, PPM ([facility]), Afib on Eliquis sent from [facility] at 6PM yesterday 2/2 resp distress not improving w/albuterol, O2SAT 91% on RA. Pt daughter notes more RUIZ than usual, also seemed "confused" yesterday, saying things that didn't make sense but this resolved. Denies fever/chills, orthopnea, chest pain, nausea/vomiting, abdominal pain, cough, congestion, syncope, dizziness, dysuria/hematuria, change in BM, peripheral swelling/skin changes, myalgias. Denies sick contacts. Baseline AAOX2. Daughter states at mental status. Daughter has not noticed any worsening leg swelling.     In the ED:  T=36.6 HR=62 DJ=011/81 (repeat 129/61) RR=18 U4PMM=21% RA  s/p solumedrol 125, NS, duonebsx3 with improvement

## 2018-01-20 NOTE — H&P ADULT - PROBLEM SELECTOR PLAN 5
-stable; c/w lexapro home dose -s/p CABG  X 3, PPM  -c/w vytorin simvastatin equivalent -s/p CABG  X 3, PPM  -c/w vytorin simvastatin equivalent 20 mg daily -Patient does not believe her shortness of breath is similar to her prior anxiety attack.  -c/w lexapro 10 home dose

## 2018-01-20 NOTE — ED ADULT NURSE REASSESSMENT NOTE - NS ED NURSE REASSESS COMMENT FT1
0015 am Patient able to ambulate w/ 1 assistance, steady on feet, Pulse oxymetry in place to check her O2 when walking. O2 at RA 92% w/ambulation. Patient denies any discomfort. Patient will be admitted. 2nd troponin obtained and sent.

## 2018-01-20 NOTE — H&P ADULT - NSHPLABSRESULTS_GEN_ALL_CORE
LABS personally reviewed: CE neg x 2, elevated pCO2 and HCO3, UA neg                        11.9   9.1   )-----------( 223      ( 19 Jan 2018 20:16 )             35.8     01-19    141  |  101  |  27<H>  ----------------------------<  89  3.7   |  26  |  1.06    Ca    10.0      19 Jan 2018 20:16    TPro  7.3  /  Alb  3.9  /  TBili  0.2  /  DBili  x   /  AST  14  /  ALT  14  /  AlkPhos  68  01-19    CARDIAC MARKERS ( 20 Jan 2018 00:29 )  x     / <0.01 ng/mL / x     / x     / x      CARDIAC MARKERS ( 19 Jan 2018 20:16 )  x     / <0.01 ng/mL / 28 U/L / x     / 1.7 ng/mL    BNP: pending  ECG personally reviewed:    RADIOLOGY personally reviewed:  1/19/19 CXR:  INTERPRETATION:  No urgent findings. F/u final report. LABS personally reviewed: CE neg x 2, elevated pCO2 and HCO3, UA neg                        11.9   9.1   )-----------( 223      ( 19 Jan 2018 20:16 )             35.8     01-19    141  |  101  |  27<H>  ----------------------------<  89  3.7   |  26  |  1.06    Ca    10.0      19 Jan 2018 20:16    TPro  7.3  /  Alb  3.9  /  TBili  0.2  /  DBili  x   /  AST  14  /  ALT  14  /  AlkPhos  68  01-19    CARDIAC MARKERS ( 20 Jan 2018 00:29 )  x     / <0.01 ng/mL / x     / x     / x      CARDIAC MARKERS ( 19 Jan 2018 20:16 )  x     / <0.01 ng/mL / 28 U/L / x     / 1.7 ng/mL    BNP: pending  ECG personally reviewed: SR 65 1st degree block, T wave abnormalities V1/V2 (possible inversion?)    RADIOLOGY personally reviewed:  1/19/19 CXR:  INTERPRETATION:  No urgent findings. F/u final report. 11.9   9.1   )-----------( 223      ( 19 Jan 2018 20:16 )             35.8     01-19    141  |  101  |  27<H>  ----------------------------<  89  3.7   |  26  |  1.06    Ca    10.0      19 Jan 2018 20:16    TPro  7.3  /  Alb  3.9  /  TBili  0.2  /  DBili  x   /  AST  14  /  ALT  14  /  AlkPhos  68  01-19    CARDIAC MARKERS ( 20 Jan 2018 00:29 )  x     / <0.01 ng/mL / x     / x     / x      CARDIAC MARKERS ( 19 Jan 2018 20:16 )  x     / <0.01 ng/mL / 28 U/L / x     / 1.7 ng/mL    I personally reviewed & interpreted the lab findings above; CBC, CMP unremarkable apart from CKD III, CEx2 negative   I personally reviewed & interpreted the radiographic findings; CXR shows no focal consolidation   I personally reviewed & interpreted the EKG findings; NSR; no active ischemia

## 2018-01-20 NOTE — H&P ADULT - PROBLEM SELECTOR PLAN 1
LUCIA skaggs -with hypoxia; may be 2/2 asthma exacervation 2/2 infection vs. CHF exacerbation, less likely PE, CXR showing no focal consolidations, now satting well on 2L  -wean down O2 as tolerated  -f/u RVP  -duonebs q6  -supportive care -with hypoxia; may be 2/2 asthma exacervation 2/2 infection vs. CHF exacerbation, less likely PE, CXR showing no focal consolidations, now satting well on 2L, CE neg x 2  -wean down O2 as tolerated  -f/u RVP  -will order BNP; consider echo if concern for acute HF exacerbation but euvolemic on exam  -duonebs q6  -supportive care -with hypoxia; may be 2/2 asthma exacervation 2/2 infection vs. CHF exacerbation, less likely PE, CXR showing no focal consolidations, now satting well on 2L, CE neg x 2  -wean down O2 as tolerated  -f/u RVP  -will order BNP; consider echo if not improving for acute HF exacerbation but euvolemic on exam  -duonebs q6  -supportive care  -c/w steroids

## 2018-01-20 NOTE — H&P ADULT - NSHPREVIEWOFSYSTEMS_GEN_ALL_CORE
Constitutional: denies fevers, chills, sweats, weight loss  HEENT: denies changes in vision, rhinorrhea, tussis, tinnitus, discharge from ears  CV: denies palpitations, CP  Pulm: denies SOB  GI: denies abdominal pain, N/V, diarrhea/constipation, hematochezia/melena  : denies dysuria/hematuria  Skin: denies new lesions, peripheral swelling  Back/MSK: denies new muscle/joint aches  Neuro/psych: denies neurologic deficits, seizure-like activity, dizziness, paresthesias, loss of sensation, incontinence  Heme/lymph: denies new bruises/bleeds, lymphadenopathy Constitutional: denies fevers, chills, sweats, weight loss  HEENT: denies changes in vision, rhinorrhea, tussis, tinnitus, discharge from ears  CV: denies palpitations, CP  Pulm: +SOB and RUIZ  GI: denies abdominal pain, N/V, diarrhea/constipation, hematochezia/melena  : denies dysuria/hematuria  Skin: denies new lesions, peripheral swelling  Back/MSK: denies new muscle/joint aches  Neuro/psych: denies neurologic deficits, seizure-like activity, dizziness, paresthesias, loss of sensation, incontinence  Heme/lymph: denies new bruises/bleeds, lymphadenopathy REVIEW OF SYSTEMS:    CONSTITUTIONAL: No weakness, fevers or chills  ENMT:  No ear pain, No vertigo or throat pain  EYES: No visual changes  or photophobia  NECK: No pain or stiffness  RESPIRATORY: +shortness of breath (see above)   CARDIOVASCULAR: No chest pain or palpitations  GASTROINTESTINAL: No abdominal or epigastric pain. No nausea, vomiting, or hematemesis; No diarrhea or constipation. No melena or hematochezia.  MUSCULOSKELETAL: No joint swelling  or warmth.  GENITOURINARY: No dysuria, frequency or hematuria  NEUROLOGICAL: No numbness or weakness  PSYCHIATRIC: No depression or anhedonia.  ENDOCRINE: No sx hypoglycemic episodes.  SKIN: No itching, burning, rashes, or lesions

## 2018-01-20 NOTE — H&P ADULT - PROBLEM SELECTOR PLAN 2
-with hypoxia; may be 2/2 asthma exacervation 2/2 infection vs. CHF exacerbation, less likely PE, CXR showing no focal consolidations, now satting well on 2L  -wean down O2 as tolerated  -f/u RVP  -duonebs q6  -supportive care -with hypoxia; may be 2/2 asthma exacerbation 2/2 infection vs. CHF exacerbation, less likely PE, CXR showing no focal consolidations, now O2 saturation well on 2L  -wean down O2 as tolerated  -f/u RVP  -duonebs q6 & steroid  -supportive care -with hypoxia; may be 2/2 asthma exacerbation 2/2 infection vs. CHF exacerbation, less likely PE, CXR showing no focal consolidations, now O2 saturation well on 2L. Of note patient has significant second-hand exposure to tobacco products so there may be underlying COPD component.   -wean down O2 as tolerated  -f/u RVP  -duonebs q6 & steroid  -supportive care

## 2018-01-20 NOTE — PROGRESS NOTE ADULT - ATTENDING COMMENTS
Charan Roe MD  Division of [facility] Medicine  Cell: [phone]  Pager: [phone]  Office: [phone]

## 2018-01-21 ENCOUNTER — TRANSCRIPTION ENCOUNTER (OUTPATIENT)
Age: 83
End: 2018-01-21

## 2018-01-21 VITALS — DIASTOLIC BLOOD PRESSURE: 62 MMHG | SYSTOLIC BLOOD PRESSURE: 138 MMHG | HEART RATE: 72 BPM

## 2018-01-21 DIAGNOSIS — D72.829 ELEVATED WHITE BLOOD CELL COUNT, UNSPECIFIED: ICD-10-CM

## 2018-01-21 DIAGNOSIS — G30.9 ALZHEIMER'S DISEASE, UNSPECIFIED: ICD-10-CM

## 2018-01-21 DIAGNOSIS — D64.9 ANEMIA, UNSPECIFIED: ICD-10-CM

## 2018-01-21 DIAGNOSIS — J45.909 UNSPECIFIED ASTHMA, UNCOMPLICATED: ICD-10-CM

## 2018-01-21 DIAGNOSIS — R45.1 RESTLESSNESS AND AGITATION: ICD-10-CM

## 2018-01-21 LAB
ANION GAP SERPL CALC-SCNC: 13 MMOL/L — SIGNIFICANT CHANGE UP (ref 5–17)
BUN SERPL-MCNC: 30 MG/DL — HIGH (ref 7–23)
CALCIUM SERPL-MCNC: 9.2 MG/DL — SIGNIFICANT CHANGE UP (ref 8.4–10.5)
CHLORIDE SERPL-SCNC: 102 MMOL/L — SIGNIFICANT CHANGE UP (ref 96–108)
CO2 SERPL-SCNC: 26 MMOL/L — SIGNIFICANT CHANGE UP (ref 22–31)
CREAT SERPL-MCNC: 0.92 MG/DL — SIGNIFICANT CHANGE UP (ref 0.5–1.3)
GLUCOSE SERPL-MCNC: 132 MG/DL — HIGH (ref 70–99)
HCT VFR BLD CALC: 32.5 % — LOW (ref 34.5–45)
HGB BLD-MCNC: 11 G/DL — LOW (ref 11.5–15.5)
MAGNESIUM SERPL-MCNC: 2.3 MG/DL — SIGNIFICANT CHANGE UP (ref 1.6–2.6)
MCHC RBC-ENTMCNC: 30.6 PG — SIGNIFICANT CHANGE UP (ref 27–34)
MCHC RBC-ENTMCNC: 33.8 GM/DL — SIGNIFICANT CHANGE UP (ref 32–36)
MCV RBC AUTO: 90.6 FL — SIGNIFICANT CHANGE UP (ref 80–100)
PLATELET # BLD AUTO: 247 K/UL — SIGNIFICANT CHANGE UP (ref 150–400)
POTASSIUM SERPL-MCNC: 3.7 MMOL/L — SIGNIFICANT CHANGE UP (ref 3.5–5.3)
POTASSIUM SERPL-SCNC: 3.7 MMOL/L — SIGNIFICANT CHANGE UP (ref 3.5–5.3)
RBC # BLD: 3.58 M/UL — LOW (ref 3.8–5.2)
RBC # FLD: 12.3 % — SIGNIFICANT CHANGE UP (ref 10.3–14.5)
SODIUM SERPL-SCNC: 141 MMOL/L — SIGNIFICANT CHANGE UP (ref 135–145)
WBC # BLD: 23.4 K/UL — HIGH (ref 3.8–10.5)
WBC # FLD AUTO: 23.4 K/UL — HIGH (ref 3.8–10.5)

## 2018-01-21 PROCEDURE — 85730 THROMBOPLASTIN TIME PARTIAL: CPT

## 2018-01-21 PROCEDURE — 81001 URINALYSIS AUTO W/SCOPE: CPT

## 2018-01-21 PROCEDURE — 97163 PT EVAL HIGH COMPLEX 45 MIN: CPT

## 2018-01-21 PROCEDURE — 85027 COMPLETE CBC AUTOMATED: CPT

## 2018-01-21 PROCEDURE — 82330 ASSAY OF CALCIUM: CPT

## 2018-01-21 PROCEDURE — 82550 ASSAY OF CK (CPK): CPT

## 2018-01-21 PROCEDURE — 84132 ASSAY OF SERUM POTASSIUM: CPT

## 2018-01-21 PROCEDURE — 82435 ASSAY OF BLOOD CHLORIDE: CPT

## 2018-01-21 PROCEDURE — 82803 BLOOD GASES ANY COMBINATION: CPT

## 2018-01-21 PROCEDURE — 80053 COMPREHEN METABOLIC PANEL: CPT

## 2018-01-21 PROCEDURE — 99285 EMERGENCY DEPT VISIT HI MDM: CPT | Mod: 25

## 2018-01-21 PROCEDURE — 87633 RESP VIRUS 12-25 TARGETS: CPT

## 2018-01-21 PROCEDURE — 80048 BASIC METABOLIC PNL TOTAL CA: CPT

## 2018-01-21 PROCEDURE — 83735 ASSAY OF MAGNESIUM: CPT

## 2018-01-21 PROCEDURE — 87581 M.PNEUMON DNA AMP PROBE: CPT

## 2018-01-21 PROCEDURE — 84100 ASSAY OF PHOSPHORUS: CPT

## 2018-01-21 PROCEDURE — 84484 ASSAY OF TROPONIN QUANT: CPT

## 2018-01-21 PROCEDURE — 96374 THER/PROPH/DIAG INJ IV PUSH: CPT

## 2018-01-21 PROCEDURE — 87486 CHLMYD PNEUM DNA AMP PROBE: CPT

## 2018-01-21 PROCEDURE — 93005 ELECTROCARDIOGRAM TRACING: CPT

## 2018-01-21 PROCEDURE — 84295 ASSAY OF SERUM SODIUM: CPT

## 2018-01-21 PROCEDURE — 71046 X-RAY EXAM CHEST 2 VIEWS: CPT

## 2018-01-21 PROCEDURE — 83605 ASSAY OF LACTIC ACID: CPT

## 2018-01-21 PROCEDURE — 82553 CREATINE MB FRACTION: CPT

## 2018-01-21 PROCEDURE — 85014 HEMATOCRIT: CPT

## 2018-01-21 PROCEDURE — 99239 HOSP IP/OBS DSCHRG MGMT >30: CPT

## 2018-01-21 PROCEDURE — 83880 ASSAY OF NATRIURETIC PEPTIDE: CPT

## 2018-01-21 PROCEDURE — 82947 ASSAY GLUCOSE BLOOD QUANT: CPT

## 2018-01-21 PROCEDURE — 94640 AIRWAY INHALATION TREATMENT: CPT

## 2018-01-21 PROCEDURE — 87086 URINE CULTURE/COLONY COUNT: CPT

## 2018-01-21 PROCEDURE — 87798 DETECT AGENT NOS DNA AMP: CPT

## 2018-01-21 PROCEDURE — 85610 PROTHROMBIN TIME: CPT

## 2018-01-21 RX ORDER — DOCUSATE SODIUM 100 MG
1 CAPSULE ORAL
Qty: 0 | Refills: 0 | COMMUNITY
Start: 2018-01-21

## 2018-01-21 RX ORDER — SENNA PLUS 8.6 MG/1
2 TABLET ORAL
Qty: 0 | Refills: 0 | COMMUNITY
Start: 2018-01-21

## 2018-01-21 RX ORDER — HALOPERIDOL DECANOATE 100 MG/ML
0.5 INJECTION INTRAMUSCULAR ONCE
Qty: 0 | Refills: 0 | Status: COMPLETED | OUTPATIENT
Start: 2018-01-21 | End: 2018-01-21

## 2018-01-21 RX ORDER — ALBUTEROL 90 UG/1
2 AEROSOL, METERED ORAL
Qty: 0 | Refills: 0 | COMMUNITY

## 2018-01-21 RX ADMIN — APIXABAN 2.5 MILLIGRAM(S): 2.5 TABLET, FILM COATED ORAL at 06:54

## 2018-01-21 RX ADMIN — Medication 650 MILLIGRAM(S): at 11:48

## 2018-01-21 RX ADMIN — HALOPERIDOL DECANOATE 0.5 MILLIGRAM(S): 100 INJECTION INTRAMUSCULAR at 05:36

## 2018-01-21 RX ADMIN — Medication 3 MILLILITER(S): at 11:47

## 2018-01-21 RX ADMIN — Medication 650 MILLIGRAM(S): at 15:16

## 2018-01-21 RX ADMIN — Medication 1 TABLET(S): at 11:47

## 2018-01-21 RX ADMIN — Medication 40 MILLIGRAM(S): at 06:54

## 2018-01-21 NOTE — PROGRESS NOTE ADULT - PROBLEM SELECTOR PLAN 1
-Patient says she feels improved today and daughters at bedside say she appears improved.  -C/w duonebs Q6H standing.   -C/w prednisone 40mg daily x 5 days total.   -Wean O2 as tolerated.   -RVP negative.
-Patient says she feels improved today and daughters at bedside say she appears improved as well.   -DC duonebs standing as may have caused agitation episode earlier today. Can use as PRN.     -C/w prednisone 40mg daily x 5 days total, today day 3/5.   -Patient off of supplemental O2 now.    -RVP negative.

## 2018-01-21 NOTE — PROGRESS NOTE ADULT - SUBJECTIVE AND OBJECTIVE BOX
Patient is a 89y old  Female who presents with a chief complaint of shortness of breath (2018 04:29)        SUBJECTIVE / OVERNIGHT EVENTS: Patient believes that her breathing is a little better. She says that she had a cough but improved. She denies CP or N/V.       MEDICATIONS  (STANDING):  ALBUTerol/ipratropium for Nebulization 3 milliLiter(s) Nebulizer every 6 hours  apixaban 2.5 milliGRAM(s) Oral every 12 hours  calcium carbonate  625 mG + Vitamin D (OsCal 250 + D) 1 Tablet(s) Oral daily  diltiazem    Tablet 60 milliGRAM(s) Oral four times a day  docusate sodium 100 milliGRAM(s) Oral three times a day  escitalopram 10 milliGRAM(s) Oral at bedtime  predniSONE   Tablet 40 milliGRAM(s) Oral daily  simvastatin 20 milliGRAM(s) Oral at bedtime    MEDICATIONS  (PRN):  acetaminophen   Tablet 650 milliGRAM(s) Oral every 6 hours PRN For Temp greater than 38 C (100.4 F)  acetaminophen   Tablet. 650 milliGRAM(s) Oral every 6 hours PRN mild to moderate pain  benzocaine 15 mG/menthol 3.6 mG Lozenge 1 Lozenge Oral three times a day PRN Sore Throat  senna 2 Tablet(s) Oral at bedtime PRN Constipation      Vital Signs Last 24 Hrs  T(C): 36.6 (2018 21:28), Max: 37 (2018 16:07)  T(F): 97.9 (2018 21:28), Max: 98.6 (2018 16:07)  HR: 73 (2018 21:28) (69 - 98)  BP: 109/56 (2018 21:28) (109/56 - 156/73)  BP(mean): --  RR: 18 (2018 21:28) (18 - 20)  SpO2: 97% (2018 21:28) (93% - 98%)  CAPILLARY BLOOD GLUCOSE        I&O's Summary        PHYSICAL EXAM  GENERAL: NAD, well-developed  HEAD:  Atraumatic, Normocephalic  EYES: EOMI, PERRLA, conjunctiva and sclera clear, MMM.   NECK: Supple, No JVD  CHEST/LUNG: Decreased air flow in left lung. No wheezes or rhonchi.   HEART: S1S2 normal; No murmurs, rubs, or gallops  ABDOMEN: Soft, Nontender, Nondistended; Bowel sounds present  EXTREMITIES: No clubbing, cyanosis, or edema  PSYCH/Neuro: Awake and answers questions and follows basic commands. Mild confusion.   SKIN: No rashes or lesions      LABS:                        11.3   8.6   )-----------( 218      ( 2018 06:53 )             34.6         138  |  99  |  26<H>  ----------------------------<  177<H>  3.4<L>   |  23  |  0.96    Ca    9.6      2018 06:53  Phos  3.0       Mg     2.0         TPro  7.4  /  Alb  3.9  /  TBili  0.2  /  DBili  x   /  AST  15  /  ALT  13  /  AlkPhos  65  20    PT/INR - ( 2018 06:57 )   PT: 13.0 sec;   INR: 1.20 ratio         PTT - ( 2018 06:57 )  PTT:31.3 sec  CARDIAC MARKERS ( 2018 00:29 )  x     / <0.01 ng/mL / x     / x     / x      CARDIAC MARKERS ( 2018 20:16 )  x     / <0.01 ng/mL / 28 U/L / x     / 1.7 ng/mL      Urinalysis Basic - ( 2018 20:16 )    Color: PL Yellow / Appearance: Clear / S.014 / pH: x  Gluc: x / Ketone: Negative  / Bili: Negative / Urobili: Negative   Blood: x / Protein: Negative / Nitrite: Negative   Leuk Esterase: Trace / RBC: 0-2 /HPF / WBC 3-5 /HPF   Sq Epi: x / Non Sq Epi: Few /HPF / Bacteria: x        RADIOLOGY & ADDITIONAL TESTS:    Imaging Personally Reviewed:  Consultant(s) Notes Reviewed:    Care Discussed with Consultants/Other Providers:
Patient is a 89y old  Female who presents with a chief complaint of shortness of breath (21 Jan 2018 13:51)        SUBJECTIVE / OVERNIGHT EVENTS: Patient reports that her breathing is better. She feels better overall. She is off of the oxygen now. She had an episode of agitation in the morning after getting the duonebs and required some haldol. She hasn't been sleeping well.       MEDICATIONS  (STANDING):  ALBUTerol/ipratropium for Nebulization 3 milliLiter(s) Nebulizer every 6 hours  apixaban 2.5 milliGRAM(s) Oral every 12 hours  calcium carbonate  625 mG + Vitamin D (OsCal 250 + D) 1 Tablet(s) Oral daily  diltiazem    Tablet 60 milliGRAM(s) Oral four times a day  docusate sodium 100 milliGRAM(s) Oral three times a day  escitalopram 10 milliGRAM(s) Oral at bedtime  predniSONE   Tablet 40 milliGRAM(s) Oral daily  simvastatin 20 milliGRAM(s) Oral at bedtime    MEDICATIONS  (PRN):  acetaminophen   Tablet 650 milliGRAM(s) Oral every 6 hours PRN For Temp greater than 38 C (100.4 F)  acetaminophen   Tablet. 650 milliGRAM(s) Oral every 6 hours PRN mild to moderate pain  benzocaine 15 mG/menthol 3.6 mG Lozenge 1 Lozenge Oral three times a day PRN Sore Throat  senna 2 Tablet(s) Oral at bedtime PRN Constipation      Vital Signs Last 24 Hrs  T(C): 36.6 (21 Jan 2018 11:54), Max: 36.6 (21 Jan 2018 04:23)  T(F): 97.9 (21 Jan 2018 11:54), Max: 97.9 (21 Jan 2018 11:54)  HR: 72 (21 Jan 2018 14:35) (68 - 72)  BP: 138/62 (21 Jan 2018 14:35) (129/56 - 145/74)  BP(mean): --  RR: 18 (21 Jan 2018 11:54) (18 - 18)  SpO2: 95% (21 Jan 2018 11:54) (95% - 96%)  CAPILLARY BLOOD GLUCOSE        I&O's Summary    21 Jan 2018 07:01  -  21 Jan 2018 22:26  --------------------------------------------------------  IN: 740 mL / OUT: 1 mL / NET: 739 mL          PHYSICAL EXAM  GENERAL: NAD, well-developed  HEAD:  Atraumatic, Normocephalic  EYES: EOMI, PERRLA, conjunctiva and sclera clear  NECK: Supple, No JVD  CHEST/LUNG: Decreased breath sounds on left lung field  HEART: Regular rate and rhythm; No murmurs, rubs, or gallops  ABDOMEN: Soft, Nontender, Nondistended; Bowel sounds present  EXTREMITIES: No clubbing, cyanosis, or edema  PSYCH/Neuro: Awake and answers questions. Sometimes makes statements that don't make sense and/or are out of context.   SKIN: No rashes or lesions      LABS:                        11.0   23.4  )-----------( 247      ( 21 Jan 2018 09:26 )             32.5     01-21    141  |  102  |  30<H>  ----------------------------<  132<H>  3.7   |  26  |  0.92    Ca    9.2      21 Jan 2018 09:26  Phos  3.0     01-20  Mg     2.3     01-21    TPro  7.4  /  Alb  3.9  /  TBili  0.2  /  DBili  x   /  AST  15  /  ALT  13  /  AlkPhos  65  01-20    PT/INR - ( 20 Jan 2018 06:57 )   PT: 13.0 sec;   INR: 1.20 ratio         PTT - ( 20 Jan 2018 06:57 )  PTT:31.3 sec  CARDIAC MARKERS ( 20 Jan 2018 00:29 )  x     / <0.01 ng/mL / x     / x     / x              RADIOLOGY & ADDITIONAL TESTS:    Imaging Personally Reviewed:  Consultant(s) Notes Reviewed:  Dr. Delacruz's note.   Care Discussed with Consultants/Other Providers: AISLINN, NP

## 2018-01-21 NOTE — PROGRESS NOTE ADULT - PROBLEM SELECTOR PLAN 3
-Patient may have had a reaction to the duonebs based on timing. Will change to PRN only. Patient received haldol.   -Likely a component of hospital associated delirium, so it will be good for patient to be safely discharged to home soon.
-C/w apixaban.   -C/w diltiazem.

## 2018-01-21 NOTE — PHYSICAL THERAPY INITIAL EVALUATION ADULT - DISCHARGE DISPOSITION, PT EVAL
Home with home PT for safety assessment, gait, balance, & strength training and to return pt to baseline functional mobility status. Pt owns RW. Supervision/assist with mobility skills at this time (pt daughter states pt. will reside for her for short term then return to [facility]). Family in agreement and good understanding of plan of care/home w/ home PT/home w/ assist

## 2018-01-21 NOTE — PROGRESS NOTE ADULT - PROBLEM SELECTOR PLAN 4
-Leukocytosis today likely due to the recent steroid dosing. Cultures no growth to day and RVP negative. Patient afebrile.
-C/w statin.   -Should clarify why patient not on ASA, BB, or ACEi/ARB.  -DASH diet.

## 2018-01-21 NOTE — PROGRESS NOTE ADULT - PROBLEM SELECTOR PROBLEM 4
Leukocytosis, unspecified type
Coronary artery disease involving native coronary artery of native heart without angina pectoris

## 2018-01-21 NOTE — PROGRESS NOTE ADULT - PROBLEM SELECTOR PLAN 6
-C/w statin.   -Should clarify why patient not on ASA, BB, or ACEi/ARB. ?No BB due to history of asthma.   -DASH diet.
-C/w statin.

## 2018-01-21 NOTE — PROGRESS NOTE ADULT - PROBLEM SELECTOR PLAN 2
-Patient says she feels improved today and daughters at bedside say she appears improved as well.   -DC duonebs standing as may have caused agitation episode earlier today. Can use as PRN.     -C/w prednisone 40mg daily x 5 days total, today day 3/5.   -Patient off of supplemental O2 now.    -RVP negative.
-Patient says she feels improved today and daughters at bedside say she appears improved.  -C/w duonebs Q6H standing.   -C/w prednisone 40mg daily x 5 days total.   -Wean O2 as tolerated.   -RVP negative.

## 2018-01-21 NOTE — PHYSICAL THERAPY INITIAL EVALUATION ADULT - PERTINENT HX OF CURRENT PROBLEM, REHAB EVAL
: 89 yo F w/PMHx asthma no intubations (mild intermittent), HLD, CAD s/p CABG X 3, HTN, PPM ([facility]), Afib on Eliquis sent from [facility] at 6PM yesterday 2/2 resp distress not improving w/albuterol, O2SAT 91% on RA, likely 2/2 asthma exacerbation.

## 2018-01-21 NOTE — CONSULT NOTE ADULT - PROBLEM SELECTOR RECOMMENDATION 9
getting nebs in hospital not at home but might benefit c home neb  Short term steroid - pred po ok, can monitor behavior

## 2018-01-21 NOTE — CONSULT NOTE ADULT - CONSULT REASON
89 yo under my care at [facility] sent to ED unresponsive (r/0 CVA but not lateralizing s/s)  owens so far neg ex Exac ASthma  Daughterrs present want to take pt home today bec of agitation in hosp  seems USOH ex exp wheeze r  dementia, MCI , not new

## 2018-01-21 NOTE — PROGRESS NOTE ADULT - ATTENDING COMMENTS
Charan Roe MD  Division of [facility] Medicine  Cell: [phone]  Pager: [phone]  Office: [phone] Charan Roe MD  Division of Hospital Medicine  Cell: [phone]  Pager: [phone]  Office: [phone]    -Patient seen and examined on 1/21/18.

## 2018-01-21 NOTE — PHYSICAL THERAPY INITIAL EVALUATION ADULT - ADDITIONAL COMMENTS
Patient was living at [facility] Uses a RW for ambulation

## 2018-01-21 NOTE — DISCHARGE NOTE ADULT - PLAN OF CARE
symptoms improved Atrial fibrillation is the most common heart rhythm problem.  The condition puts you at risk for has stroke and heart attack  It helps if you control your blood pressure, not drink more than 1-2 alcohol drinks per day, cut down on caffeine, getting treatment for over active thyroid gland, and get regular exercise  Call your doctor if you feel your heart racing or beating unusually, chest tightness or pain, lightheaded, faint, shortness of breath especially with exercise  It is important to take your heart medication as prescribed  You may be on anticoagulation which is very important to take as directed - you may need blood work to monitor drug levels Low salt diet  Activity as tolerated.  Take all medication as prescribed.  Follow up with your medical doctor for routine blood pressure monitoring at your next visit.  Notify your doctor if you have any of the following symptoms:   Dizziness, Lightheadedness, Blurry vision, Headache, Chest pain, Shortness of breath Coronary artery disease is a condition where the arteries the supply the heart muscle get clogges with fatty deposits & puts you at risk for a heart attack  Call your doctor if you have any new pain, pressure, or discomfort in the center of your chest, pain, tingling or discomfort in arms, back, neck, jaw, or stomach, shortness of breath, nausea, vomiting, burping or heartburn, sweating, cold and clammy skin, racing or abnormal heartbeat for more than 10 minutes or if they keep coming & going.  Call 911 and do not tr to get to hospital by care  You can help yourself with lefestyle changes (quitting smoking if you smoke), eat lots of fruits & vegetables & low fat dairy products, not a lot of meat & fatty foods, walk or some form of physical activity most days of the week, lose weight if you are overweight  Take your cardiac medication as prescribed to lower cholesterol, to lower blood pressure, aspirin to prevent blood clots, and diabetes control  Make sure to keep appointments with doctor for cardiac follow up care You will need to follow up with your primary medical doctor within one week of discharge -  please call to make an appointment. You are being discharged on two more days of prednisone - you will take it on 1/22/18 and 1/23/18 and then discontinue. Your WBC became elevated secondary to steroids.  You will need to follow up with your primary medical doctor within one week of discharge -  please call to make an appointment.  At this appointment, you will need to have your WBC monitored.

## 2018-01-21 NOTE — PROGRESS NOTE ADULT - PROBLEM SELECTOR PLAN 10
-C/w apixaban, which covers DVT PPx. Ambulate with assistance.   -PT frank says home PT.   -Bowel regimen.     9. Dispo: -I discussed with CM regarding sending patient back to her assisted living facility since she is clinically improved. Daughters want to take her home first and will watch her a couple of days until she goes back to the [facility]. CM discussed with daughters and forms for assisted living were to be completed. DC to home today with outpatient PMD follow up. -C/w apixaban, which covers DVT PPx. Ambulate with assistance.   -PT frank says home PT.   -Bowel regimen.     11. Dispo: -I discussed with CM regarding sending patient back to her assisted living facility since she is clinically improved. Daughters want to take her home first and will watch her a couple of days until she goes back to the [facility]. CM discussed with daughters and forms for assisted living were to be completed. DC to home today with outpatient PMD follow up.

## 2018-01-21 NOTE — DISCHARGE NOTE ADULT - OTHER SIGNIFICANT FINDINGS
< from: Xray Chest 2 Views PA/Lat (01.19.18 @ 20:29) >  IMPRESSION:   Clear lungs.    < end of copied text >

## 2018-01-21 NOTE — CONSULT NOTE ADULT - SUBJECTIVE AND OBJECTIVE BOX
Patient is a 89y old  Female who presents with a chief complaint of shortness of breath (20 Jan 2018 04:29)      HPI:from adm H+P:  89 yo F w/PMHx asthma no intubations (mild intermittent), HLD, CAD s/p CABG X 3, HTN, PPM ([facility]), Afib on Eliquis sent from [facility] at 6PM yesterday 2/2 resp distress not improving w/albuterol, O2SAT 91% on RA. Pt daughter notes more RUIZ than usual, also seemed "confused" yesterday, saying things that didn't make sense but this resolved. Denies fever/chills, orthopnea, chest pain, nausea/vomiting, abdominal pain, cough, congestion, syncope, dizziness, dysuria/hematuria, change in BM, peripheral swelling/skin changes, myalgias. Denies sick contacts. Baseline AAOX2. Daughter states at mental status. Daughter has not noticed any worsening leg swelling.     In the ED:  T=36.6 HR=62 OJ=231/81 (repeat 129/61) RR=18 M4JII=57% RA  s/p solumedrol 125, NS, duonebsx3 with improvement (20 Jan 2018 04:29)        PAST MEDICAL & SURGICAL HISTORY:  Hyperlipidemia  Hypertension  ASHD (arteriosclerotic heart disease)  CAD (coronary artery disease)  Anxiety  Asthma  Artificial pacemaker: [employer] 2001, replaced 2005  model 5386  Colon polyp: 2010  S/P CABG x 3      Mediucations:  acetaminophen   Tablet 650 milliGRAM(s) Oral every 6 hours PRN  acetaminophen   Tablet. 650 milliGRAM(s) Oral every 6 hours PRN  ALBUTerol/ipratropium for Nebulization 3 milliLiter(s) Nebulizer every 6 hours  apixaban 2.5 milliGRAM(s) Oral every 12 hours  benzocaine 15 mG/menthol 3.6 mG Lozenge 1 Lozenge Oral three times a day PRN  calcium carbonate  625 mG + Vitamin D (OsCal 250 + D) 1 Tablet(s) Oral daily  diltiazem    Tablet 60 milliGRAM(s) Oral four times a day  docusate sodium 100 milliGRAM(s) Oral three times a day  escitalopram 10 milliGRAM(s) Oral at bedtime  predniSONE   Tablet 40 milliGRAM(s) Oral daily  senna 2 Tablet(s) Oral at bedtime PRN  simvastatin 20 milliGRAM(s) Oral at bedtime        FAMILY HISTORY:  Family history of asthma (Mother)  5 daughters A+W      Social History non smoker no etoh lives in [place], Fxn mostlly indep, pleasant usu quite cooperative s agitation at home    REVIEW OF SYSTEMS      General:feels ok aware of asthma nad	    Skin/Breast:  	  Ophthalmologic:no ch v  	  ENMT:	noch hearing, om noco, ate,     Respiratory and Thorax:no cough no sp, no sob this am  	  Cardiovascular:	no cp no palp    Gastrointestinal:	no nvcd    Genitourinary:	no fdi    Musculoskeletal:	no pain    Neurological:no co now was agitated earlier this am wanted to get oob and go home    Psychiatric:no co now    Hematology/Lymphatics:	    Endocrine:no polu udd    Allergic/Immunologic:	    Vital Signs Last 24 Hrs  T(C): 36.6 (21 Jan 2018 04:23), Max: 37 (20 Jan 2018 16:07)  T(F): 97.8 (21 Jan 2018 04:23), Max: 98.6 (20 Jan 2018 16:07)  HR: 68 (21 Jan 2018 04:23) (68 - 80)  BP: 139/65 (21 Jan 2018 04:23) (109/56 - 156/73)  BP(mean): --  RR: 18 (21 Jan 2018 04:23) (18 - 18)  SpO2: 95% (21 Jan 2018 04:23) (95% - 97%)    Physical Exam:   H&N: nc at, scout cwnl , no cb tm  CV: rrr m  Pulm:cta left exp wheeze r no rales no coarse bs  GI:bs+ soft nt  :  Extrem:no cce  Skin:cdi  Vasc:  Neuro:Speech clear               Affect:calm approp now               Memory:ST poor               Judgment: usually good               Orientation:x2 now, reorients, daughters present               Cognition:impaired by mem loss               Sensory:nl               Motor:nf atmae no s/s cva               Gait:can               CN:gr nl  Psych: calm coop  Other:    Labs:  CBC Full  -  ( 20 Jan 2018 06:53 )  WBC Count : 8.6 K/uL  Hemoglobin : 11.3 g/dL  Hematocrit : 34.6 %  Platelet Count - Automated : 218 K/uL  Mean Cell Volume : 91.3 fl  Mean Cell Hemoglobin : 29.8 pg  Mean Cell Hemoglobin Concentration : 32.6 gm/dL  Auto Neutrophil # : 7.9 K/uL  Auto Lymphocyte # : 0.7 K/uL  Auto Monocyte # : 0.0 K/uL  Auto Eosinophil # : 0.0 K/uL  Auto Basophil # : 0.0 K/uL  Auto Neutrophil % : 91.4 %  Auto Lymphocyte % : 8.3 %  Auto Monocyte % : 0.2 %  Auto Eosinophil % : 0.1 %  Auto Basophil % : 0.0 %      01-20    138  |  99  |  26<H>  ----------------------------<  177<H>  3.4<L>   |  23  |  0.96    Ca    9.6      20 Jan 2018 06:53  Phos  3.0     01-20  Mg     2.0     01-20    TPro  7.4  /  Alb  3.9  /  TBili  0.2  /  DBili  x   /  AST  15  /  ALT  13  /  AlkPhos  65  01-20      LIVER FUNCTIONS - ( 20 Jan 2018 06:53 )  Alb: 3.9 g/dL / Pro: 7.4 g/dL / ALK PHOS: 65 U/L / ALT: 13 U/L RC / AST: 15 U/L / GGT: x             PT/INR - ( 20 Jan 2018 06:57 )   PT: 13.0 sec;   INR: 1.20 ratio         PTT - ( 20 Jan 2018 06:57 )  PTT:31.3 sec    HEALTH ISSUES - PROBLEM Dx:  Hyperlipidemia, unspecified hyperlipidemia type: Hyperlipidemia, unspecified hyperlipidemia type  Essential hypertension: Essential hypertension  Paroxysmal atrial fibrillation: Paroxysmal atrial fibrillation  Coronary artery disease involving native coronary artery of native heart without angina pectoris: Coronary artery disease involving native coronary artery of native heart without angina pectoris  Acute respiratory failure with hypoxia: Acute respiratory failure with hypoxia  Atrial fibrillation: Atrial fibrillation  Respiratory distress, acute: Respiratory distress, acute  Preventive measure: Preventive measure  Anxiety: Anxiety  CAD (coronary artery disease): CAD (coronary artery disease)  Hyperlipidemia: Hyperlipidemia  Hypertension: Hypertension  Mild intermittent asthma with exacerbation: Mild intermittent asthma with exacerbation

## 2018-01-21 NOTE — DISCHARGE NOTE ADULT - CARE PROVIDER_API CALL
Charan Roe), Internal Medicine  [address] [place] [address]  Phone: [phone]  Fax: [phone] Charan Roe), Internal Medicine  [address] [place] [address]  Phone: [phone]  Fax: [phone]    Sung Delacruz), Family Medicine  [address] [place] [address]  Phone: [phone]  Fax: [phone]

## 2018-01-21 NOTE — DISCHARGE NOTE ADULT - MEDICATION SUMMARY - MEDICATIONS TO CHANGE
I will SWITCH the dose or number of times a day I take the medications listed below when I get home from the hospital:    ProAir HFA 90 mcg/inh inhalation aerosol  -- 2 puff(s) inhaled 2 times a day

## 2018-01-21 NOTE — DISCHARGE NOTE ADULT - CARE PLAN
Principal Discharge DX:	Acute respiratory failure with hypoxia  Goal:	symptoms improved  Secondary Diagnosis:	Mild intermittent asthma with exacerbation  Secondary Diagnosis:	Paroxysmal atrial fibrillation  Assessment and plan of treatment:	Atrial fibrillation is the most common heart rhythm problem.  The condition puts you at risk for has stroke and heart attack  It helps if you control your blood pressure, not drink more than 1-2 alcohol drinks per day, cut down on caffeine, getting treatment for over active thyroid gland, and get regular exercise  Call your doctor if you feel your heart racing or beating unusually, chest tightness or pain, lightheaded, faint, shortness of breath especially with exercise  It is important to take your heart medication as prescribed  You may be on anticoagulation which is very important to take as directed - you may need blood work to monitor drug levels  Secondary Diagnosis:	Hypertension  Assessment and plan of treatment:	Low salt diet  Activity as tolerated.  Take all medication as prescribed.  Follow up with your medical doctor for routine blood pressure monitoring at your next visit.  Notify your doctor if you have any of the following symptoms:   Dizziness, Lightheadedness, Blurry vision, Headache, Chest pain, Shortness of breath  Secondary Diagnosis:	CAD (coronary artery disease)  Assessment and plan of treatment:	Coronary artery disease is a condition where the arteries the supply the heart muscle get clogges with fatty deposits & puts you at risk for a heart attack  Call your doctor if you have any new pain, pressure, or discomfort in the center of your chest, pain, tingling or discomfort in arms, back, neck, jaw, or stomach, shortness of breath, nausea, vomiting, burping or heartburn, sweating, cold and clammy skin, racing or abnormal heartbeat for more than 10 minutes or if they keep coming & going.  Call 911 and do not tr to get to hospital by care  You can help yourself with lefestyle changes (quitting smoking if you smoke), eat lots of fruits & vegetables & low fat dairy products, not a lot of meat & fatty foods, walk or some form of physical activity most days of the week, lose weight if you are overweight  Take your cardiac medication as prescribed to lower cholesterol, to lower blood pressure, aspirin to prevent blood clots, and diabetes control  Make sure to keep appointments with doctor for cardiac follow up care Principal Discharge DX:	Acute respiratory failure with hypoxia  Goal:	symptoms improved  Assessment and plan of treatment:	You will need to follow up with your primary medical doctor within one week of discharge -  please call to make an appointment.  Secondary Diagnosis:	Mild intermittent asthma with exacerbation  Assessment and plan of treatment:	You are being discharged on two more days of prednisone - you will take it on 1/22/18 and 1/23/18 and then discontinue.  Secondary Diagnosis:	Paroxysmal atrial fibrillation  Assessment and plan of treatment:	Atrial fibrillation is the most common heart rhythm problem.  The condition puts you at risk for has stroke and heart attack  It helps if you control your blood pressure, not drink more than 1-2 alcohol drinks per day, cut down on caffeine, getting treatment for over active thyroid gland, and get regular exercise  Call your doctor if you feel your heart racing or beating unusually, chest tightness or pain, lightheaded, faint, shortness of breath especially with exercise  It is important to take your heart medication as prescribed  You may be on anticoagulation which is very important to take as directed - you may need blood work to monitor drug levels  Secondary Diagnosis:	Hypertension  Assessment and plan of treatment:	Low salt diet  Activity as tolerated.  Take all medication as prescribed.  Follow up with your medical doctor for routine blood pressure monitoring at your next visit.  Notify your doctor if you have any of the following symptoms:   Dizziness, Lightheadedness, Blurry vision, Headache, Chest pain, Shortness of breath  Secondary Diagnosis:	CAD (coronary artery disease)  Assessment and plan of treatment:	Coronary artery disease is a condition where the arteries the supply the heart muscle get clogges with fatty deposits & puts you at risk for a heart attack  Call your doctor if you have any new pain, pressure, or discomfort in the center of your chest, pain, tingling or discomfort in arms, back, neck, jaw, or stomach, shortness of breath, nausea, vomiting, burping or heartburn, sweating, cold and clammy skin, racing or abnormal heartbeat for more than 10 minutes or if they keep coming & going.  Call 911 and do not tr to get to hospital by care  You can help yourself with lefestyle changes (quitting smoking if you smoke), eat lots of fruits & vegetables & low fat dairy products, not a lot of meat & fatty foods, walk or some form of physical activity most days of the week, lose weight if you are overweight  Take your cardiac medication as prescribed to lower cholesterol, to lower blood pressure, aspirin to prevent blood clots, and diabetes control  Make sure to keep appointments with doctor for cardiac follow up care  Secondary Diagnosis:	Leukocytosis  Assessment and plan of treatment:	Your WBC became elevated secondary to steroids.  You will need to follow up with your primary medical doctor within one week of discharge -  please call to make an appointment.  At this appointment, you will need to have your WBC monitored.

## 2018-01-21 NOTE — DISCHARGE NOTE ADULT - MEDICATION SUMMARY - MEDICATIONS TO TAKE
I will START or STAY ON the medications listed below when I get home from the hospital:    predniSONE 20 mg oral tablet  -- 2 tab(s) by mouth once a day x 2 more days  -- Indication: For Asthma    Mapap 500 mg oral tablet  -- 2 tab(s) by mouth every 6 hours, As Needed for pain  -- Indication: For Pain    dilTIAZem 240 mg/24 hours oral tablet, extended release  -- 1 tab(s) by mouth once a day  -- Indication: For Hypertension    Eliquis 2.5 mg oral tablet  -- 1 tab(s) by mouth 2 times a day  -- Indication: For Atrial fibrillation    Lexapro 10 mg oral tablet  -- 1 tab(s) by mouth once a day  -- Indication: For Depression    Vytorin 10 mg-20 mg oral tablet  -- 1 tab(s) by mouth every other day  -- Indication: For Hyperlipidemia    ProAir HFA 90 mcg/inh inhalation aerosol  -- 1 puff(s) inhaled every 6 hours, As Needed - for shortness of breath and/or wheezing  -- Indication: For Asthma    senna oral tablet  -- 2 tab(s) by mouth once a day (at bedtime), As needed, Constipation  -- Indication: For Constipation    docusate sodium 100 mg oral capsule  -- 1 cap(s) by mouth 3 times a day, As Needed - for constipation  -- Indication: For Constipation    Calcium 600+D 600 mg-200 intl units oral tablet  -- 1 tab(s) by mouth once a day  -- Indication: For Supplement I will START or STAY ON the medications listed below when I get home from the hospital:    predniSONE 20 mg oral tablet  -- 2 tab(s) by mouth once a day x 2 more days (1/22 and 1/23) and then discontinue  -- Indication: For Asthma    Mapap 500 mg oral tablet  -- 2 tab(s) by mouth every 6 hours, As Needed for pain  -- Indication: For Pain    dilTIAZem 240 mg/24 hours oral tablet, extended release  -- 1 tab(s) by mouth once a day  -- Indication: For Hypertension    Eliquis 2.5 mg oral tablet  -- 1 tab(s) by mouth 2 times a day  -- Indication: For Atrial fibrillation    Lexapro 10 mg oral tablet  -- 1 tab(s) by mouth once a day  -- Indication: For Depression    Vytorin 10 mg-20 mg oral tablet  -- 1 tab(s) by mouth every other day  -- Indication: For Hyperlipidemia    ProAir HFA 90 mcg/inh inhalation aerosol  -- 1 puff(s) inhaled every 6 hours, As Needed - for shortness of breath and/or wheezing  -- Indication: For Asthma    senna oral tablet  -- 2 tab(s) by mouth once a day (at bedtime), As needed, Constipation  -- Indication: For Constipation    docusate sodium 100 mg oral capsule  -- 1 cap(s) by mouth 3 times a day, As Needed - for constipation  -- Indication: For Constipation    Calcium 600+D 600 mg-200 intl units oral tablet  -- 1 tab(s) by mouth once a day  -- Indication: For Supplement I will START or STAY ON the medications listed below when I get home from the hospital:    Physical Therapy  -- Continue with outpatient physical therapy as patient was previously receiving prior to hospital admission -gait training/strength building  -- Indication: For Weakness    predniSONE 20 mg oral tablet  -- 2 tab(s) by mouth once a day x 2 more days (1/22 and 1/23) and then discontinue  -- Indication: For Asthma    Mapap 500 mg oral tablet  -- 2 tab(s) by mouth every 6 hours, As Needed for pain  -- Indication: For Pain    dilTIAZem 240 mg/24 hours oral tablet, extended release  -- 1 tab(s) by mouth once a day  -- Indication: For Hypertension    Eliquis 2.5 mg oral tablet  -- 1 tab(s) by mouth 2 times a day  -- Indication: For Atrial fibrillation    Lexapro 10 mg oral tablet  -- 1 tab(s) by mouth once a day  -- Indication: For Depression    Vytorin 10 mg-20 mg oral tablet  -- 1 tab(s) by mouth every other day  -- Indication: For Hyperlipidemia    ProAir HFA 90 mcg/inh inhalation aerosol  -- 1 puff(s) inhaled every 6 hours, As Needed - for shortness of breath and/or wheezing  -- Indication: For Asthma    senna oral tablet  -- 2 tab(s) by mouth once a day (at bedtime), As needed, Constipation  -- Indication: For Constipation    docusate sodium 100 mg oral capsule  -- 1 cap(s) by mouth 3 times a day, As Needed - for constipation  -- Indication: For Constipation    Calcium 600+D 600 mg-200 intl units oral tablet  -- 1 tab(s) by mouth once a day  -- Indication: For Supplement

## 2018-01-21 NOTE — DISCHARGE NOTE ADULT - ADDITIONAL INSTRUCTIONS
You will need to follow up with your primary medical doctor within one week of discharge -  please call to make an appointment.  At this appointment, you will need to have your WBC monitored.

## 2018-01-21 NOTE — CONSULT NOTE ADULT - ASSESSMENT
Asthma exac, on prednisone which may contribute to confusion  should avopid haldo; if possible  Hx AF on eliquis, has been well controlled and stable on diltiazem, sees Dr Reyes also, prob not on BBLKr 2/2 Asthma  RVP-neg  CXR neg also  no cva  Daughters want to take pt home today and pt will probably do better in [facility] environment and can FU c me ANYTIME

## 2018-01-21 NOTE — DISCHARGE NOTE ADULT - PATIENT PORTAL LINK FT
“You can access the Follow[facility] Patient Portal, offered by [facility], by registering with the following website: [email or web address]”

## 2018-01-21 NOTE — CHART NOTE - NSCHARTNOTEFT_GEN_A_CORE
MEDICINE LUISA AMADOR  Patient is a 89 year old female who presents with a chief complaint of shortness of breath. (20 Jan 2018 04:29)       Event Summary:  Called by RN to report patient with agitation this AM.  Patient seen at bedside - appears very agitated.  Trying to get out of bed, kicking, pushing and using inappropriate comments towards daughter and staff.      Plan: Agitation  - Haldol 0.5mg IV push x1 dose ordered  - Continue with fall precautions  - Will continue to monitor closely  - Primary team to follow up this AM      #[id number]  Genesis Spann PA-C  Medicine Department

## 2018-01-21 NOTE — PROGRESS NOTE ADULT - PROBLEM SELECTOR PROBLEM 6
Coronary artery disease involving native coronary artery of native heart without angina pectoris
Hyperlipidemia, unspecified hyperlipidemia type

## 2018-01-21 NOTE — PROGRESS NOTE ADULT - PROBLEM SELECTOR PLAN 8
-C/w statin.
-C/w apixaban, which covers DVT PPx. Ambulate with assistance.   -PT eval.   -Bowel regimen.     9. Dispo: -D/w CM regarding sending patient back to her assisted living facility once she is clinically improved.

## 2018-01-21 NOTE — DISCHARGE NOTE ADULT - HOSPITAL COURSE
89 yo F w/PMHx asthma no intubations (mild intermittent), HLD, CAD s/p CABG X 3, HTN, PPM ([facility]), Afib on Eliquis sent from [facility] at 6PM yesterday 2/2 resp distress not improving w/albuterol, O2SAT 91% on RA. Pt daughter notes more RUIZ than usual, also seemed "confused" yesterday, saying things that didn't make sense but this resolved. Denies fever/chills, orthopnea, chest pain, nausea/vomiting, abdominal pain, cough, congestion, syncope, dizziness, dysuria/hematuria, change in BM, peripheral swelling/skin changes, myalgias. Denies sick contacts. Baseline AAOX2. Daughter states at mental status. Daughter has not noticed any worsening leg swelling. 89 yo F w/PMHx asthma no intubations (mild intermittent), HLD, CAD s/p CABG X 3, HTN, PPM ([facility]), Afib on Eliquis sent from [facility] at 6PM yesterday 2/2 resp distress not improving w/albuterol, O2SAT 91% on RA. Pt daughter notes more RUIZ than usual, also seemed "confused" yesterday, saying things that didn't make sense but this resolved. Denies fever/chills, orthopnea, chest pain, nausea/vomiting, abdominal pain, cough, congestion, syncope, dizziness, dysuria/hematuria, change in BM, peripheral swelling/skin changes, myalgias. Denies sick contacts. Baseline AAOX2. Daughter states at mental status. Daughter has not noticed any worsening leg swelling.     1/19 - Chest Xray - clear lungs         - IV Solumedrol x 1  1/20 - RVP negative         - Oral prednisone started  1/21 - Pt ready for discharge - 2 more days of Prednisone, then discontinue

## 2018-01-21 NOTE — PROGRESS NOTE ADULT - ASSESSMENT
89 year old female with PMH of mild intermittent asthma, HLD, dementia, CAD s/p CABG X 3, HTN, PPM ([facility]), Afib on Eliquis; sent from [facility] assisted living with respiratory distress with hypoxia; likely secondary to asthma exacerbation.
89 year old female with PMH of mild intermittent asthma, HLD, dementia, CAD s/p CABG X 3, HTN, PPM ([facility]), Afib on Eliquis; sent from [facility] assisted living with respiratory distress with hypoxia; likely secondary to asthma exacerbation.

## 2018-01-21 NOTE — DISCHARGE NOTE ADULT - SECONDARY DIAGNOSIS.
Mild intermittent asthma with exacerbation Paroxysmal atrial fibrillation Hypertension CAD (coronary artery disease) Leukocytosis

## 2018-07-15 ENCOUNTER — INPATIENT (INPATIENT)
Facility: HOSPITAL | Age: 83
LOS: 1 days | Discharge: HOME | DRG: 312 | End: 2018-07-17
Attending: HOSPITALIST | Admitting: HOSPITALIST
Payer: MEDICARE

## 2018-07-15 VITALS
RESPIRATION RATE: 16 BRPM | SYSTOLIC BLOOD PRESSURE: 177 MMHG | TEMPERATURE: 97 F | DIASTOLIC BLOOD PRESSURE: 79 MMHG | OXYGEN SATURATION: 98 % | HEART RATE: 64 BPM

## 2018-07-15 DIAGNOSIS — I25.10 ATHEROSCLEROTIC HEART DISEASE OF NATIVE CORONARY ARTERY WITHOUT ANGINA PECTORIS: ICD-10-CM

## 2018-07-15 DIAGNOSIS — Z95.0 PRESENCE OF CARDIAC PACEMAKER: Chronic | ICD-10-CM

## 2018-07-15 DIAGNOSIS — Z29.9 ENCOUNTER FOR PROPHYLACTIC MEASURES, UNSPECIFIED: ICD-10-CM

## 2018-07-15 DIAGNOSIS — K63.5 POLYP OF COLON: Chronic | ICD-10-CM

## 2018-07-15 DIAGNOSIS — R55 SYNCOPE AND COLLAPSE: ICD-10-CM

## 2018-07-15 DIAGNOSIS — I48.91 UNSPECIFIED ATRIAL FIBRILLATION: ICD-10-CM

## 2018-07-15 DIAGNOSIS — G30.9 ALZHEIMER'S DISEASE, UNSPECIFIED: ICD-10-CM

## 2018-07-15 DIAGNOSIS — I10 ESSENTIAL (PRIMARY) HYPERTENSION: ICD-10-CM

## 2018-07-15 DIAGNOSIS — J45.20 MILD INTERMITTENT ASTHMA, UNCOMPLICATED: ICD-10-CM

## 2018-07-15 DIAGNOSIS — R26.9 UNSPECIFIED ABNORMALITIES OF GAIT AND MOBILITY: ICD-10-CM

## 2018-07-15 DIAGNOSIS — Z95.1 PRESENCE OF AORTOCORONARY BYPASS GRAFT: Chronic | ICD-10-CM

## 2018-07-15 LAB
ALBUMIN SERPL ELPH-MCNC: 3.9 G/DL — SIGNIFICANT CHANGE UP (ref 3.3–5)
ALP SERPL-CCNC: 75 U/L — SIGNIFICANT CHANGE UP (ref 40–120)
ALT FLD-CCNC: 16 U/L — SIGNIFICANT CHANGE UP (ref 10–45)
ANION GAP SERPL CALC-SCNC: 13 MMOL/L — SIGNIFICANT CHANGE UP (ref 5–17)
APPEARANCE UR: CLEAR — SIGNIFICANT CHANGE UP
APTT BLD: 34 SEC — SIGNIFICANT CHANGE UP (ref 27.5–37.4)
AST SERPL-CCNC: 19 U/L — SIGNIFICANT CHANGE UP (ref 10–40)
BACTERIA # UR AUTO: ABNORMAL /HPF
BASOPHILS # BLD AUTO: 0.1 K/UL — SIGNIFICANT CHANGE UP (ref 0–0.2)
BASOPHILS NFR BLD AUTO: 0.8 % — SIGNIFICANT CHANGE UP (ref 0–2)
BILIRUB SERPL-MCNC: 0.2 MG/DL — SIGNIFICANT CHANGE UP (ref 0.2–1.2)
BILIRUB UR-MCNC: NEGATIVE — SIGNIFICANT CHANGE UP
BUN SERPL-MCNC: 28 MG/DL — HIGH (ref 7–23)
CALCIUM SERPL-MCNC: 9.6 MG/DL — SIGNIFICANT CHANGE UP (ref 8.4–10.5)
CHLORIDE SERPL-SCNC: 98 MMOL/L — SIGNIFICANT CHANGE UP (ref 96–108)
CO2 SERPL-SCNC: 27 MMOL/L — SIGNIFICANT CHANGE UP (ref 22–31)
COLOR SPEC: YELLOW — SIGNIFICANT CHANGE UP
CREAT SERPL-MCNC: 1.2 MG/DL — SIGNIFICANT CHANGE UP (ref 0.5–1.3)
DIFF PNL FLD: NEGATIVE — SIGNIFICANT CHANGE UP
EOSINOPHIL # BLD AUTO: 0.3 K/UL — SIGNIFICANT CHANGE UP (ref 0–0.5)
EOSINOPHIL NFR BLD AUTO: 2.8 % — SIGNIFICANT CHANGE UP (ref 0–6)
EPI CELLS # UR: SIGNIFICANT CHANGE UP /HPF
GLUCOSE SERPL-MCNC: 109 MG/DL — HIGH (ref 70–99)
GLUCOSE UR QL: NEGATIVE — SIGNIFICANT CHANGE UP
HCT VFR BLD CALC: 38.3 % — SIGNIFICANT CHANGE UP (ref 34.5–45)
HGB BLD-MCNC: 12.8 G/DL — SIGNIFICANT CHANGE UP (ref 11.5–15.5)
INR BLD: 1.38 RATIO — HIGH (ref 0.88–1.16)
KETONES UR-MCNC: NEGATIVE — SIGNIFICANT CHANGE UP
LEUKOCYTE ESTERASE UR-ACNC: ABNORMAL
LYMPHOCYTES # BLD AUTO: 19.9 % — SIGNIFICANT CHANGE UP (ref 13–44)
LYMPHOCYTES # BLD AUTO: 2 K/UL — SIGNIFICANT CHANGE UP (ref 1–3.3)
MCHC RBC-ENTMCNC: 30.2 PG — SIGNIFICANT CHANGE UP (ref 27–34)
MCHC RBC-ENTMCNC: 33.3 GM/DL — SIGNIFICANT CHANGE UP (ref 32–36)
MCV RBC AUTO: 90.6 FL — SIGNIFICANT CHANGE UP (ref 80–100)
MONOCYTES # BLD AUTO: 0.8 K/UL — SIGNIFICANT CHANGE UP (ref 0–0.9)
MONOCYTES NFR BLD AUTO: 7.9 % — SIGNIFICANT CHANGE UP (ref 2–14)
NEUTROPHILS # BLD AUTO: 6.9 K/UL — SIGNIFICANT CHANGE UP (ref 1.8–7.4)
NEUTROPHILS NFR BLD AUTO: 68.6 % — SIGNIFICANT CHANGE UP (ref 43–77)
NITRITE UR-MCNC: NEGATIVE — SIGNIFICANT CHANGE UP
PH UR: 6 — SIGNIFICANT CHANGE UP (ref 5–8)
PLATELET # BLD AUTO: 255 K/UL — SIGNIFICANT CHANGE UP (ref 150–400)
POTASSIUM SERPL-MCNC: 3.6 MMOL/L — SIGNIFICANT CHANGE UP (ref 3.5–5.3)
POTASSIUM SERPL-SCNC: 3.6 MMOL/L — SIGNIFICANT CHANGE UP (ref 3.5–5.3)
PROT SERPL-MCNC: 7.7 G/DL — SIGNIFICANT CHANGE UP (ref 6–8.3)
PROT UR-MCNC: SIGNIFICANT CHANGE UP
PROTHROM AB SERPL-ACNC: 15 SEC — HIGH (ref 9.8–12.7)
RBC # BLD: 4.23 M/UL — SIGNIFICANT CHANGE UP (ref 3.8–5.2)
RBC # FLD: 12.5 % — SIGNIFICANT CHANGE UP (ref 10.3–14.5)
RBC CASTS # UR COMP ASSIST: SIGNIFICANT CHANGE UP /HPF (ref 0–2)
SODIUM SERPL-SCNC: 138 MMOL/L — SIGNIFICANT CHANGE UP (ref 135–145)
SP GR SPEC: 1.01 — SIGNIFICANT CHANGE UP (ref 1.01–1.02)
TROPONIN T, HIGH SENSITIVITY RESULT: 33 NG/L — SIGNIFICANT CHANGE UP (ref 0–51)
UROBILINOGEN FLD QL: NEGATIVE — SIGNIFICANT CHANGE UP
WBC # BLD: 10.1 K/UL — SIGNIFICANT CHANGE UP (ref 3.8–10.5)
WBC # FLD AUTO: 10.1 K/UL — SIGNIFICANT CHANGE UP (ref 3.8–10.5)
WBC UR QL: SIGNIFICANT CHANGE UP /HPF (ref 0–5)

## 2018-07-15 PROCEDURE — 71100 X-RAY EXAM RIBS UNI 2 VIEWS: CPT | Mod: 26

## 2018-07-15 PROCEDURE — 99285 EMERGENCY DEPT VISIT HI MDM: CPT | Mod: GC

## 2018-07-15 PROCEDURE — 99223 1ST HOSP IP/OBS HIGH 75: CPT

## 2018-07-15 PROCEDURE — 71045 X-RAY EXAM CHEST 1 VIEW: CPT | Mod: 26,59

## 2018-07-15 PROCEDURE — 70450 CT HEAD/BRAIN W/O DYE: CPT | Mod: 26

## 2018-07-15 RX ORDER — ESCITALOPRAM OXALATE 10 MG/1
5 TABLET, FILM COATED ORAL DAILY
Qty: 0 | Refills: 0 | Status: DISCONTINUED | OUTPATIENT
Start: 2018-07-15 | End: 2018-07-17

## 2018-07-15 RX ORDER — APIXABAN 2.5 MG/1
2.5 TABLET, FILM COATED ORAL EVERY 12 HOURS
Qty: 0 | Refills: 0 | Status: DISCONTINUED | OUTPATIENT
Start: 2018-07-15 | End: 2018-07-17

## 2018-07-15 RX ORDER — DILTIAZEM HCL 120 MG
240 CAPSULE, EXT RELEASE 24 HR ORAL DAILY
Qty: 0 | Refills: 0 | Status: DISCONTINUED | OUTPATIENT
Start: 2018-07-15 | End: 2018-07-17

## 2018-07-15 RX ORDER — LOSARTAN POTASSIUM 100 MG/1
100 TABLET, FILM COATED ORAL DAILY
Qty: 0 | Refills: 0 | Status: DISCONTINUED | OUTPATIENT
Start: 2018-07-15 | End: 2018-07-17

## 2018-07-15 RX ORDER — ESCITALOPRAM OXALATE 10 MG/1
1 TABLET, FILM COATED ORAL
Qty: 0 | Refills: 0 | COMMUNITY

## 2018-07-15 RX ORDER — SIMVASTATIN 20 MG/1
20 TABLET, FILM COATED ORAL AT BEDTIME
Qty: 0 | Refills: 0 | Status: DISCONTINUED | OUTPATIENT
Start: 2018-07-15 | End: 2018-07-17

## 2018-07-15 RX ORDER — ACETAMINOPHEN 500 MG
650 TABLET ORAL EVERY 6 HOURS
Qty: 0 | Refills: 0 | Status: DISCONTINUED | OUTPATIENT
Start: 2018-07-15 | End: 2018-07-17

## 2018-07-15 NOTE — ED ADULT NURSE REASSESSMENT NOTE - NS ED NURSE REASSESS COMMENT FT1
Report received from         PANDA colbert  Pt resting/sleeping. Pt drowsy but accusable to stimulation. Pt states " im tired because I don't sleep much" Pt denies any drug or alcohol use, states he has a home and feels safe there  RVP collected, pending results   Safety maintained at all times, bed in lowest position, call bell in hand.  Will continue to monitor closely.

## 2018-07-15 NOTE — H&P ADULT - NSHPPHYSICALEXAM_GEN_ALL_CORE
PHYSICAL EXAM:  GENERAL: NAD, well-developed  HEAD:  Atraumatic, normocephalic  EYES: EOMI, conjunctiva and sclera clear  NECK: Supple, no JVD  CHEST/LUNG: Clear to auscultation bilaterally; no wheezing or rales  HEART: Regular rate and rhythm  ABDOMEN: Soft, nontender, nondistended; bowel sounds present  EXTREMITIES:  no clubbing, cyanosis, or edema  PSYCH: calm affect, not anxious  NEUROLOGY: non-focal, AAOx1 (to person only)  SKIN: No rashes or lesions  MUSCULOSKELETAL: no back pain, moving all extremities

## 2018-07-15 NOTE — ED PROVIDER NOTE - PSH
Artificial pacemaker  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

## 2018-07-15 NOTE — H&P ADULT - NSHPREVIEWOFSYSTEMS_GEN_ALL_CORE
CONSTITUTIONAL: No weakness, fevers, chills  EYES/ENT: No visual changes;  no vertigo or throat pain   NECK: No pain or stiffness  RESPIRATORY: No cough, wheezing, no shortness of breath  CARDIOVASCULAR: No chest pain or palpitations  GASTROINTESTINAL: no nausea, vomiting, no abdominal pain  GENITOURINARY: no polyuria, no dysuria  NEUROLOGICAL: no headaches,  MUSCULOSKELETAL: no back pain, no weakness   SKIN: No itching, burning, rashes, or lesions   PSYCH: no anxiety, depression  HEME: no gum bleeding, no bruising

## 2018-07-15 NOTE — ED PROVIDER NOTE - OBJECTIVE STATEMENT
89 y/o hx of CAD, asthma, HTN, HLD, AFib on Eliquis presents s/p fall. 89 yo F w/PMHx asthma no intubations (mild intermittent), HLD, CAD s/p CABG X 3, HTN, PPM ([facility]), Afib on Eliquis, Dementia (AAOx1-2) at baseline sent from [facility] presents s/p fall.     As per EMS, patient sustained fall earlier today of unknown circumstances. Patient does not recall fall but notes no pain on interview. Denies chest pain, shortness of breath, vision changes, nausea/vomiting or dizziness.     PMD: Dr. Tanisha Reyes

## 2018-07-15 NOTE — H&P ADULT - PROBLEM SELECTOR PLAN 1
pt with unwitnessed fall, cannot r/o syncope - ddx includes structural cardiac vs arrhythmia vs vasovagal vs mechanical fall   - CT head negative without acute findings   - EKG showing  - first hs trop 33, will check second level now    - will monitor on tele  - order TTE  - no infectious etiology on exam or ROS, CXR and UA negative; no electrolyte abn on BMP  - PT eval, fall precautions

## 2018-07-15 NOTE — ED ADULT NURSE REASSESSMENT NOTE - NS ED NURSE REASSESS COMMENT FT1
Report received from   PANDA Ross  Pt resting comfortably  returned from radiology Awaiting results    Safety maintained at all times, bed in lowest position, call bell in hand.  Will continue to monitor closely.

## 2018-07-15 NOTE — H&P ADULT - HISTORY OF PRESENT ILLNESS
89F with PMH of asthma (no hx of intubations, mild intermittent), CAD s/p CABG X 3, HTN, PPM ([facility]), Afib on Eliquis, dementia (AAOx1-2) presents from [facility] for unwitnessed fall. Pt does not recall fall and unable to provide history - per ED chart, pt found s/p fall, unwitnessed and cannot rule out syncope. Pt herself does not recall falling today, does not know where she is currently - she denies any head pain, blurry vision, chest pain, SOB, abdominal pain, nausea/vomiting, no pain in any joints, no difficulty urinating.     Called daughter and next of kin Cristal Almanzar - per dtr, patient demented and often confused about place and time, has been living in [facility] in the memory care unit for 2.5 years; walks with a walker, able to feed and use toiler by herself.

## 2018-07-15 NOTE — H&P ADULT - NSHPSOCIALHISTORY_GEN_ALL_CORE
Never smoker, no etoh, no drugs   Lives in [facility] for past 2.5 years, ambulates with walker, able to feed and use bathroom by herself (per dtr), AAOx 1-2 at baseline

## 2018-07-15 NOTE — ED PROVIDER NOTE - PHYSICAL EXAMINATION
GENERAL: no acute distress; well-developed  HEAD:  Atraumatic, Normocephalic  EYES: EOMI, PERRLA, conjunctiva and sclera clear  ENT: MMM; oropharynx clear  NECK: Supple, No JVD  CHEST/LUNG: Clear to auscultation bilaterally; No wheeze. Mild TTP of lower right ribs.   HEART: Regular rate and rhythm; No murmurs, rubs, or gallops  ABDOMEN: Soft, Nontender, Nondistended; Bowel sounds present  EXTREMITIES:  2+ Peripheral Pulses, No clubbing, cyanosis, or edema  PSYCH: AAOx1 to self.   NEUROLOGY: no focal motor or sensory deficits. 5/5 muscle strength in all extremities.   SKIN: No rashes or lesions

## 2018-07-15 NOTE — H&P ADULT - ASSESSMENT
89F with PMH of asthma (no hx of intubations, mild intermittent), CAD s/p CABG X 3, HTN, PPM ([facility]), Afib on Eliquis, dementia (AAOx1-2) presents from [facility] for unwitnessed fall, admitted for syncope work-up.

## 2018-07-15 NOTE — H&P ADULT - PROBLEM SELECTOR PLAN 6
c/w losartan diltiazem at home doses, with hold parameters, monitor BP q8h  holding HCTZ given orthostatic hypotension risk in elderly

## 2018-07-15 NOTE — H&P ADULT - NSHPLABSRESULTS_GEN_ALL_CORE
Labs personally reviewed and interpreted by me - CBC, BMP normal   Imaging personally reviewed and interpreted by me - CXR clear with no acute pathology   EKG personally reviewed and interpreted by me -                           12.8   10.1  )-----------( 255      ( 15 Jul 2018 18:43 )             38.3     07-15    138  |  98  |  28<H>  ----------------------------<  109<H>  3.6   |  27  |  1.20    Ca    9.6      15 Jul 2018 18:43    TPro  7.7  /  Alb  3.9  /  TBili  0.2  /  DBili  x   /  AST  19  /  ALT  16  /  AlkPhos  75  07-15    PT/INR - ( 15 Jul 2018 18:43 )   PT: 15.0 sec;   INR: 1.38 ratio      Urinalysis Basic - ( 15 Jul 2018 19:03 )    Color: Yellow / Appearance: Clear / S.013 / pH: x  Gluc: x / Ketone: Negative  / Bili: Negative / Urobili: Negative   Blood: x / Protein: Trace / Nitrite: Negative   Leuk Esterase: Small / RBC: 3-5 /HPF / WBC 6-10 /HPF   Sq Epi: x / Non Sq Epi: OCC /HPF / Bacteria: Few /HPF    < from: Xray Chest 1 View- PORTABLE-Urgent (07.15.18 @ 19:21) >  ******PRELIMINARY REPORT******        INTERPRETATION:  no emergent findings  < end of copied text >    < from: CT Head No Cont (07.15.18 @ 19:14) >  IMPRESSION:   No acute intracranial hemorrhage, hydrocephalus, midline shift,   territorial infarct, or mass effect. No CT evidence of acute territorial   infarct.  < end of copied text > Labs personally reviewed and interpreted by me - CBC, BMP normal   Imaging personally reviewed and interpreted by me - CXR clear with no acute pathology   EKG personally reviewed and interpreted by me - sinus with 1st degree block, no DARRON,                           12.8   10.1  )-----------( 255      ( 15 Jul 2018 18:43 )             38.3     07-15    138  |  98  |  28<H>  ----------------------------<  109<H>  3.6   |  27  |  1.20    Ca    9.6      15 Jul 2018 18:43    TPro  7.7  /  Alb  3.9  /  TBili  0.2  /  DBili  x   /  AST  19  /  ALT  16  /  AlkPhos  75  07-15    PT/INR - ( 15 Jul 2018 18:43 )   PT: 15.0 sec;   INR: 1.38 ratio      Urinalysis Basic - ( 15 Jul 2018 19:03 )    Color: Yellow / Appearance: Clear / S.013 / pH: x  Gluc: x / Ketone: Negative  / Bili: Negative / Urobili: Negative   Blood: x / Protein: Trace / Nitrite: Negative   Leuk Esterase: Small / RBC: 3-5 /HPF / WBC 6-10 /HPF   Sq Epi: x / Non Sq Epi: OCC /HPF / Bacteria: Few /HPF    < from: Xray Chest 1 View- PORTABLE-Urgent (07.15.18 @ 19:21) >  ******PRELIMINARY REPORT******        INTERPRETATION:  no emergent findings  < end of copied text >    < from: CT Head No Cont (07.15.18 @ 19:14) >  IMPRESSION:   No acute intracranial hemorrhage, hydrocephalus, midline shift,   territorial infarct, or mass effect. No CT evidence of acute territorial   infarct.  < end of copied text >

## 2018-07-15 NOTE — ED PROVIDER NOTE - MEDICAL DECISION MAKING DETAILS
89 yo F w/PMHx asthma no intubations (mild intermittent), HLD, CAD s/p CABG X 3, HTN, PPM ([facility]), Afib on Eliquis, Dementia (AAOx1-2) at baseline sent from [facility] presents s/p fall. Unknown circumstances. DDx includes syncope (arrythmia, infection, electrolyte derangement) vs. mechanical fall. Plan: CTH, CXR, basic labs, coags, UA, EKG, admit.

## 2018-07-15 NOTE — H&P ADULT - PROBLEM SELECTOR PLAN 3
dementia with AAOx 1-2 at baseline, no behavioral disturbance at this time   high risk for delirium, frequent orientation, avoid benzos   will c/t monitor

## 2018-07-15 NOTE — ED ADULT NURSE NOTE - OBJECTIVE STATEMENT
89y female brought in by EMS from [facility] s/p fall. Pt A&Ox2 (disoriented to time, A&Ox1 at baseline) and hypertensive. Hx of HTN, CAD, HLD. EMS reports witnessed mechanical fall from standing height. EMS reports patient did hit head, + blood thinner use, denies LOC. Pt denies dizziness prior to fall. Pt denies pain upon arrival. No obvious deformities noted. Denies chest pain, sob, fever/chills, n/v/d. 89y female brought in by EMS from [facility] s/p fall. Pt A&Ox2 (disoriented to time, A&Ox1 at baseline) and hypertensive. Hx of HTN, CAD, HLD and pacemaker on eliquis. EMS reports witnessed mechanical fall from standing height. EMS reports patient did hit head, + blood thinner use, denies LOC. Pt denies dizziness prior to fall. Pt denies pain upon arrival. No obvious deformities noted. Right lower rib area tender upon palpation. Denies chest pain, sob, fever/chills, n/v/d. PERRLA 3mm, face symmetrical, extremities strong, sensation intact. Lungs CTA. Abdomen soft, nontender, + bowel sounds.

## 2018-07-16 ENCOUNTER — TRANSCRIPTION ENCOUNTER (OUTPATIENT)
Age: 83
End: 2018-07-16

## 2018-07-16 VITALS — RESPIRATION RATE: 18 BRPM | OXYGEN SATURATION: 96 % | TEMPERATURE: 98 F

## 2018-07-16 DIAGNOSIS — E78.5 HYPERLIPIDEMIA, UNSPECIFIED: ICD-10-CM

## 2018-07-16 DIAGNOSIS — N39.0 URINARY TRACT INFECTION, SITE NOT SPECIFIED: ICD-10-CM

## 2018-07-16 DIAGNOSIS — Z95.0 PRESENCE OF CARDIAC PACEMAKER: ICD-10-CM
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
## 2018-07-16 PROBLEM — F41.9 ANXIETY DISORDER, UNSPECIFIED: Chronic | Status: ACTIVE | Noted: 2018-01-04

## 2018-07-16 PROBLEM — I25.10 ATHEROSCLEROTIC HEART DISEASE OF NATIVE CORONARY ARTERY WITHOUT ANGINA PECTORIS: Chronic | Status: ACTIVE | Noted: 2018-01-04

## 2018-07-16 PROBLEM — I10 ESSENTIAL (PRIMARY) HYPERTENSION: Chronic | Status: ACTIVE | Noted: 2018-01-04

## 2018-07-16 PROBLEM — J45.909 UNSPECIFIED ASTHMA, UNCOMPLICATED: Chronic | Status: ACTIVE | Noted: 2018-01-04

## 2018-07-16 LAB — TROPONIN T, HIGH SENSITIVITY RESULT: 36 NG/L — SIGNIFICANT CHANGE UP (ref 0–51)

## 2018-07-16 PROCEDURE — 81001 URINALYSIS AUTO W/SCOPE: CPT

## 2018-07-16 PROCEDURE — 85610 PROTHROMBIN TIME: CPT

## 2018-07-16 PROCEDURE — 99285 EMERGENCY DEPT VISIT HI MDM: CPT

## 2018-07-16 PROCEDURE — 93010 ELECTROCARDIOGRAM REPORT: CPT

## 2018-07-16 PROCEDURE — 85730 THROMBOPLASTIN TIME PARTIAL: CPT

## 2018-07-16 PROCEDURE — 93005 ELECTROCARDIOGRAM TRACING: CPT

## 2018-07-16 PROCEDURE — 36415 COLL VENOUS BLD VENIPUNCTURE: CPT

## 2018-07-16 PROCEDURE — 93306 TTE W/DOPPLER COMPLETE: CPT

## 2018-07-16 PROCEDURE — 85027 COMPLETE CBC AUTOMATED: CPT

## 2018-07-16 PROCEDURE — 71045 X-RAY EXAM CHEST 1 VIEW: CPT

## 2018-07-16 PROCEDURE — 97161 PT EVAL LOW COMPLEX 20 MIN: CPT

## 2018-07-16 PROCEDURE — 80053 COMPREHEN METABOLIC PANEL: CPT

## 2018-07-16 PROCEDURE — 93306 TTE W/DOPPLER COMPLETE: CPT | Mod: 26

## 2018-07-16 PROCEDURE — 70450 CT HEAD/BRAIN W/O DYE: CPT

## 2018-07-16 PROCEDURE — 84484 ASSAY OF TROPONIN QUANT: CPT

## 2018-07-16 PROCEDURE — 99239 HOSP IP/OBS DSCHRG MGMT >30: CPT

## 2018-07-16 PROCEDURE — 71100 X-RAY EXAM RIBS UNI 2 VIEWS: CPT

## 2018-07-16 RX ORDER — DILTIAZEM HCL 120 MG
1 CAPSULE, EXT RELEASE 24 HR ORAL
Qty: 0 | Refills: 0 | COMMUNITY
Start: 2018-07-16

## 2018-07-16 RX ORDER — IPRATROPIUM/ALBUTEROL SULFATE 18-103MCG
3 AEROSOL WITH ADAPTER (GRAM) INHALATION EVERY 6 HOURS
Qty: 0 | Refills: 0 | Status: DISCONTINUED | OUTPATIENT
Start: 2018-07-15 | End: 2018-07-17

## 2018-07-16 RX ORDER — SIMVASTATIN 20 MG/1
1 TABLET, FILM COATED ORAL
Qty: 0 | Refills: 0 | COMMUNITY
Start: 2018-07-16

## 2018-07-16 RX ORDER — DILTIAZEM HCL 120 MG
1 CAPSULE, EXT RELEASE 24 HR ORAL
Qty: 0 | Refills: 0 | COMMUNITY

## 2018-07-16 RX ORDER — ESCITALOPRAM OXALATE 10 MG/1
1 TABLET, FILM COATED ORAL
Qty: 0 | Refills: 0 | COMMUNITY
Start: 2018-07-16

## 2018-07-16 RX ORDER — APIXABAN 2.5 MG/1
1 TABLET, FILM COATED ORAL
Qty: 0 | Refills: 0 | COMMUNITY

## 2018-07-16 RX ORDER — ESCITALOPRAM OXALATE 10 MG/1
0.5 TABLET, FILM COATED ORAL
Qty: 0 | Refills: 0 | COMMUNITY

## 2018-07-16 RX ORDER — LANOLIN ALCOHOL/MO/W.PET/CERES
1 CREAM (GRAM) TOPICAL
Qty: 0 | Refills: 0 | COMMUNITY

## 2018-07-16 RX ORDER — LOSARTAN POTASSIUM 100 MG/1
1 TABLET, FILM COATED ORAL
Qty: 0 | Refills: 0 | COMMUNITY
Start: 2018-07-16

## 2018-07-16 RX ORDER — APIXABAN 2.5 MG/1
1 TABLET, FILM COATED ORAL
Qty: 0 | Refills: 0 | COMMUNITY
Start: 2018-07-16

## 2018-07-16 RX ADMIN — LOSARTAN POTASSIUM 100 MILLIGRAM(S): 100 TABLET, FILM COATED ORAL at 07:02

## 2018-07-16 RX ADMIN — Medication 240 MILLIGRAM(S): at 07:02

## 2018-07-16 RX ADMIN — ESCITALOPRAM OXALATE 5 MILLIGRAM(S): 10 TABLET, FILM COATED ORAL at 13:13

## 2018-07-16 RX ADMIN — APIXABAN 2.5 MILLIGRAM(S): 2.5 TABLET, FILM COATED ORAL at 17:10

## 2018-07-16 RX ADMIN — APIXABAN 2.5 MILLIGRAM(S): 2.5 TABLET, FILM COATED ORAL at 07:02

## 2018-07-16 NOTE — PHYSICAL THERAPY INITIAL EVALUATION ADULT - CRITERIA FOR SKILLED THERAPEUTIC INTERVENTIONS
impairments found/anticipated discharge recommendation/functional limitations in following categories/rehab potential/[facility]

## 2018-07-16 NOTE — DISCHARGE NOTE ADULT - ADDITIONAL INSTRUCTIONS
Follow up with Dr Delacruz within  next 3 to 5 days after discharge from hospital. Follow up with Dr Delacruz within  next 3 to 5 days and Dr Bee within 1 week after discharge from hospital.  Call to schedule appointments.

## 2018-07-16 NOTE — PROCEDURE NOTE - ADDITIONAL PROCEDURE DETAILS
Indication for interrogation: s/p at assisted living facility   Measured impedance wnl sensing and pacing thresholds also wnl. However note highlighted stating patient with atrial lead noise which was confirmed via provocative maneuvers; i.e pt with intermittent atrial lead noise.   Pt is not PM dependent  Since last device interrogation 7/5/128 stored data revealed 14 Atrial mode switch episode some of which was appropriate for atrial flutter with controlled ventricular rates others c/w Atrial lead noise.   Changes made: mode changed from DDD to DDI to avoid inappropriate mode switches after d/w Dr Shipman.     36123 Indication for interrogation: s/p at assisted living facility   Measured impedance wnl sensing and pacing thresholds also wnl. However note highlighted stating patient with atrial lead noise which was confirmed via provocative maneuvers; i.e pt with intermittent atrial lead noise.   Pt is not PM dependent  Since last device interrogation 7/5/128 stored data revealed 14 Atrial mode switch episode between 7/14/18- 7/15/18 some of which was appropriate for atrial flutter with ventricular rates  between  bpm however some episodes  c/w Atrial lead noise.   Changes made: mode changed from DDD to DDI to avoid inappropriate mode switches after d/w Dr Shipman.     [id number] Indication for interrogation: s/p at assisted living facility   Measured impedance wnl sensing and pacing thresholds also wnl. However note highlighted stating patient with atrial lead noise which was confirmed via provocative maneuvers; i.e pt with intermittent atrial lead noise.   Pt is not PM dependent  Since last device interrogation 7/5/128 stored data revealed 14 Atrial mode switch episode between 7/14/18- 7/15/18 some of which was appropriate for atrial flutter with ventricular rates  between  bpm and lasting between  4seconds-8 mins 28 seconds.   however some episodes  c/w Atrial lead noise.   Changes made: mode changed from DDD to DDI to avoid inappropriate mode switches after d/w Dr Shipman.     [id number] Indication for interrogation: s/p at assisted living facility   Measured impedance wnl sensing and pacing thresholds also wnl. However note highlighted stating patient with atrial lead noise which was confirmed via provocative maneuvers; i.e pt with intermittent atrial lead noise.   Pt is not PM dependent  Since last device interrogation 7/5/128 stored data revealed 14 Atrial mode switch episodes between 7/14/18- 7/15/18 some of which was appropriate for atrial flutter with ventricular rates  between  bpm and lasting between  4seconds-8 mins 28 seconds. However some episodes are c/w inappropriate mode switching due to Atrial lead noise.   Changes made: mode changed from DDD to DDI, to avoid inappropriate mode switches, after d/w Dr Shipman.     [id number]

## 2018-07-16 NOTE — CONSULT NOTE ADULT - SUBJECTIVE AND OBJECTIVE BOX
Patient is a 89y old  Female who presents with a chief complaint of unwitnessed fall (15 Jul 2018 23:25)      HPI:  89F with PMH of asthma (no hx of intubations, mild intermittent), CAD s/p CABG X 3, HTN, PPM ([facility]), Afib on Eliquis, dementia (AAOx1-2) presents from [facility] for unwitnessed fall. Pt does not recall fall and unable to provide history - per ED chart, pt found s/p fall, unwitnessed and cannot rule out syncope. Pt herself does not recall falling today, does not know where she is currently - she denies any head pain, blurry vision, chest pain, SOB, abdominal pain, nausea/vomiting, no pain in any joints, no difficulty urinating.     Called daughter and next of kin rCistal Almanzar - per dtr, patient demented and often confused about place and time, has been living in [facility] in the memory care unit for 2.5 years; walks with a walker, able to feed and use toiler by herself. (15 Jul 2018 23:25)        PAST MEDICAL & SURGICAL HISTORY:  Hyperlipidemia  Hypertension  ASHD (arteriosclerotic heart disease)  CAD (coronary artery disease)  Anxiety  Asthma  Artificial pacemaker: [employer] 2001, replaced 2005  model 5386  Colon polyp: 2010  S/P CABG x 3      Mediucations:  acetaminophen   Tablet. 650 milliGRAM(s) Oral every 6 hours PRN  ALBUTerol/ipratropium for Nebulization 3 milliLiter(s) Nebulizer every 6 hours PRN  apixaban 2.5 milliGRAM(s) Oral every 12 hours  diltiazem    milliGRAM(s) Oral daily  escitalopram 5 milliGRAM(s) Oral daily  losartan 100 milliGRAM(s) Oral daily  simvastatin 20 milliGRAM(s) Oral at bedtime        FAMILY HISTORY:  Family history of asthma (Sibling)  several children, A+W, widowx3      Social History  lives in group home, Dementia unit  non smoker no etoh, memory quite poor but ambulates indep, takes direction and cooperates    REVIEW OF SYSTEMS      General:	nad no co    Skin/Breast:  	  Ophthalmologic:no ch v/h  	  ENMT:	    Respiratory and Thorax:no cough no sp no sob  	  Cardiovascular:	no cp palp    Gastrointestinal:	no nvcd    Genitourinary:	no fdi    Musculoskeletal:	no a+p    Neurological:	no co no change, aware mem poor    Psychiatric:	    Hematology/Lymphatics:	    Endocrine:	no poly udd    Allergic/Immunologic:	  AOSN y    Vital Signs Last 24 Hrs  T(C): 36.4 (16 Jul 2018 04:33), Max: 36.8 (16 Jul 2018 00:10)  T(F): 97.5 (16 Jul 2018 04:33), Max: 98.2 (16 Jul 2018 00:10)  HR: 60 (16 Jul 2018 04:33) (60 - 69)  BP: 150/69 (16 Jul 2018 04:33) (150/69 - 189/67)  BP(mean): --  RR: 18 (16 Jul 2018 04:33) (16 - 18)  SpO2: 99% (16 Jul 2018 04:33) (94% - 100%)    Physical Exam: vss nad no co  H&N:nc at, no cb notm scout cwnl, om hy  CV:, rrr distant  Pulm:ctab no rrw  GI:bs+ soft nt  :  Extrem:no cce  Skin:cdi  Vasc:h/m -, v v-  Neuro:Speech claer               Affect:approp, calm coop               Memory:ST and now LT poor               Judgment: usu good               Orientation:hospital               Cognition:impaired               Sensory:gr nl               Motor:nf [facility]               Gait:ambulated to toilet c nurse               CN:gr nl  Psych:calm coop  Other:    Labs:  CBC Full  -  ( 15 Jul 2018 18:43 )  WBC Count : 10.1 K/uL  Hemoglobin : 12.8 g/dL  Hematocrit : 38.3 %  Platelet Count - Automated : 255 K/uL  Mean Cell Volume : 90.6 fl  Mean Cell Hemoglobin : 30.2 pg  Mean Cell Hemoglobin Concentration : 33.3 gm/dL  Auto Neutrophil # : 6.9 K/uL  Auto Lymphocyte # : 2.0 K/uL  Auto Monocyte # : 0.8 K/uL  Auto Eosinophil # : 0.3 K/uL  Auto Basophil # : 0.1 K/uL  Auto Neutrophil % : 68.6 %  Auto Lymphocyte % : 19.9 %  Auto Monocyte % : 7.9 %  Auto Eosinophil % : 2.8 %  Auto Basophil % : 0.8 %      07-15    138  |  98  |  28<H>  ----------------------------<  109<H>  3.6   |  27  |  1.20    Ca    9.6      15 Jul 2018 18:43    TPro  7.7  /  Alb  3.9  /  TBili  0.2  /  DBili  x   /  AST  19  /  ALT  16  /  AlkPhos  75  07-15      LIVER FUNCTIONS - ( 15 Jul 2018 18:43 )  Alb: 3.9 g/dL / Pro: 7.7 g/dL / ALK PHOS: 75 U/L / ALT: 16 U/L / AST: 19 U/L / GGT: x             PT/INR - ( 15 Jul 2018 18:43 )   PT: 15.0 sec;   INR: 1.38 ratio         PTT - ( 15 Jul 2018 18:43 )  PTT:34.0 sec    HEALTH ISSUES - PROBLEM Dx:  Need for prophylactic measure: Need for prophylactic measure  Mild intermittent asthma without complication: Mild intermittent asthma without complication  Essential hypertension: Essential hypertension  CAD (coronary artery disease): CAD (coronary artery disease)  Atrial fibrillation, unspecified type: Atrial fibrillation, unspecified type  Alzheimer's dementia without behavioral disturbance, unspecified timing of dementia onset: Alzheimer&#x27;s dementia without behavioral disturbance, unspecified timing of dementia onset  Gait disturbance: Gait disturbance  Syncope and collapse: Syncope and collapse

## 2018-07-16 NOTE — PROGRESS NOTE ADULT - SUBJECTIVE AND OBJECTIVE BOX
Gavin Barreto MD  Division of Hospital Medicine  Pager [phone]      LUISA CANO  89y  Female      Patient is a 89y old  Female who presents with a chief complaint of unwitnessed fall (15 Jul 2018 23:25)      INTERVAL HPI/OVERNIGHT EVENTS:  Pleasant, poor historian. No complaints      REVIEW OF SYSTEMS: Unable to assess given dementia    T(C): 36.4 (18 @ 04:33), Max: 36.8 (18 @ 00:10)  HR: 87 (18 @ 13:18) (60 - 87)  BP: 147/73 (18 @ 13:18) (147/73 - 189/67)  RR: 18 (18 @ 04:33) (16 - 18)  SpO2: 98% (18 @ 13:18) (94% - 100%)  Wt(kg): --Vital Signs Last 24 Hrs  T(C): 36.4 (2018 04:33), Max: 36.8 (2018 00:10)  T(F): 97.5 (2018 04:33), Max: 98.2 (2018 00:10)  HR: 87 (2018 13:18) (60 - 87)  BP: 147/73 (2018 13:18) (147/73 - 189/67)  BP(mean): --  RR: 18 (2018 04:33) (16 - 18)  SpO2: 98% (2018 13:18) (94% - 100%)    PHYSICAL EXAM:  GENERAL: NAD, well-groomed, well-developed  HEAD:  Atraumatic, Normocephalic  ENMT: No tonsillar erythema, exudates,; Moist mucous membranes. No lesions  NECK: Supple, No JVD, Normal thyroid  CHEST/LUNG: Clear to percussion bilaterally; No rales, rhonchi, wheezing, or rubs  HEART: Regular rate and rhythm; No murmurs, rubs, or gallops  ABDOMEN: Soft, Nontender, Nondistended; Bowel sounds present.   EXTREMITIES:  2+ Peripheral Pulses, No clubbing, cyanosis, or edema  PSYCH: Alert & Oriented x1 (person)    Consultant(s) Notes Reviewed:  [x ] YES  [ ] NO  Care Discussed with Consultants/Other Providers [ x] YES  [ ] NO    LABS:                        12.8   10.1  )-----------( 255      ( 15 Jul 2018 18:43 )             38.3     07-15    138  |  98  |  28<H>  ----------------------------<  109<H>  3.6   |  27  |  1.20    Ca    9.6      15 Jul 2018 18:43    TPro  7.7  /  Alb  3.9  /  TBili  0.2  /  DBili  x   /  AST  19  /  ALT  16  /  AlkPhos  75  07-15    PT/INR - ( 15 Jul 2018 18:43 )   PT: 15.0 sec;   INR: 1.38 ratio         PTT - ( 15 Jul 2018 18:43 )  PTT:34.0 sec  Urinalysis Basic - ( 15 Jul 2018 19:03 )    Color: Yellow / Appearance: Clear / S.013 / pH: x  Gluc: x / Ketone: Negative  / Bili: Negative / Urobili: Negative   Blood: x / Protein: Trace / Nitrite: Negative   Leuk Esterase: Small / RBC: 3-5 /HPF / WBC 6-10 /HPF   Sq Epi: x / Non Sq Epi: OCC /HPF / Bacteria: Few /HPF      CAPILLARY BLOOD GLUCOSE            Urinalysis Basic - ( 15 Jul 2018 19:03 )    Color: Yellow / Appearance: Clear / S.013 / pH: x  Gluc: x / Ketone: Negative  / Bili: Negative / Urobili: Negative   Blood: x / Protein: Trace / Nitrite: Negative   Leuk Esterase: Small / RBC: 3-5 /HPF / WBC 6-10 /HPF   Sq Epi: x / Non Sq Epi: OCC /HPF / Bacteria: Few /HPF        RADIOLOGY & ADDITIONAL TESTS:    Imaging Personally Reviewed:  [x ] YES  [ ] NO

## 2018-07-16 NOTE — PHYSICAL THERAPY INITIAL EVALUATION ADULT - PRECAUTIONS/LIMITATIONS, REHAB EVAL
Pt herself does not recall falling today, does not know where she is currently - she denies any head pain, blurry vision, chest pain, SOB, abdominal pain, nausea/vomiting, no pain in any joints, no difficulty urinating. Called daughter and next of kin Cristal Almanzar - per dtr, patient demented and often confused about place and time, has been living in [facility] in the memory care unit for 2.5 years; walks with a walker, able to feed and use toiler by herself.   EKst degree AV block. CT: (-) CT head. X-ray: lungs clear./no known precautions/limitations

## 2018-07-16 NOTE — PHYSICAL THERAPY INITIAL EVALUATION ADULT - ADDITIONAL COMMENTS
As per resident daughter Epifanio, resident lives at the [facility] in the memory care unit. She was provided with supervision for all functional mobility. Resident ambulated with RW with supervision from caregivers distances greater than 100 ft on the unit.  Resident owns RW.

## 2018-07-16 NOTE — DISCHARGE NOTE ADULT - CARE PLAN
Principal Discharge DX:	Syncope and collapse  Goal:	No further episodes of Syncope and Collapse  Assessment and plan of treatment:	HOME CARE INSTRUCTIONS  Have someone stay with you until you feel stable.  Do not drive, operate machinery, or play sports until your caregiver says it is okay.  Keep all follow-up appointments as directed by your caregiver.   Lie down right away if you start feeling like you might faint. Breathe deeply and steadily. Wait until all the symptoms have passed.Drink enough fluids to keep your urine clear or pale yellow.  If you are taking blood pressure or heart medicine, get up slowly, taking several minutes to sit and then stand. This can reduce dizziness.  SEEK IMMEDIATE MEDICAL CARE IF:  You have a severe headache.  You have unusual pain in the chest, abdomen, or back.  You are bleeding from the mouth or rectum, or you have black or tarry stool.  You have an irregular or very fast heartbeat.  You have pain with breathing.  You have repeated fainting or seizure-like jerking during an episode.  You faint when sitting or lying down.  You have confusion.  You have difficulty walking.  You have severe weakness.  You have vision problems.  If you fainted, call your local emergency services ( [phone] ). Do not drive yourself to the hospital  Secondary Diagnosis:	Fall  Assessment and plan of treatment:	Safety Precautions - including Fall Precautions.  Frequent reorientation to surroundings.  Secondary Diagnosis:	UTI (urinary tract infection)  Secondary Diagnosis:	Essential hypertension  Assessment and plan of treatment:	Take your medication as prescribed.  Follow up with your medical doctor for routine blood pressure monitoring, and to establish long term blood pressure treatment goals.  Low salt diet  Activity as tolerated.  Notify your doctor if you have any of the following symptoms:   Dizziness, Lightheadedness, Blurry vision, Headache, Chest pain, Shortness of breath  Secondary Diagnosis:	Hyperlipidemia  Assessment and plan of treatment:	Take your medication as prescribed.   Follow up with your medical doctor for routine blood  work monitoring, and to establish long term treatment goals.  Secondary Diagnosis:	Asthma  Assessment and plan of treatment:	Take your medications as prescribed.  Follow up with your PMD within 1 week after discharge from hospital.  Seek emergency care for difficulty breathing.  Secondary Diagnosis:	Alzheimer's dementia without behavioral disturbance, unspecified timing of dementia onset Principal Discharge DX:	Syncope and collapse  Goal:	No further episodes of Syncope and Collapse  Assessment and plan of treatment:	HOME CARE INSTRUCTIONS  Have someone stay with you until you feel stable.  Do not drive, operate machinery, or play sports until your caregiver says it is okay.  Keep all follow-up appointments as directed by your caregiver.   Lie down right away if you start feeling like you might faint. Breathe deeply and steadily. Wait until all the symptoms have passed.Drink enough fluids to keep your urine clear or pale yellow.  If you are taking blood pressure or heart medicine, get up slowly, taking several minutes to sit and then stand. This can reduce dizziness.  SEEK IMMEDIATE MEDICAL CARE IF:  You have a severe headache.  You have unusual pain in the chest, abdomen, or back.  You are bleeding from the mouth or rectum, or you have black or tarry stool.  You have an irregular or very fast heartbeat.  You have pain with breathing.  You have repeated fainting or seizure-like jerking during an episode.  You faint when sitting or lying down.  You have confusion.  You have difficulty walking.  You have severe weakness.  You have vision problems.  If you fainted, call your local emergency services ( 911 ). Do not drive yourself to the hospital  Secondary Diagnosis:	Fall  Assessment and plan of treatment:	Safety Precautions - including Fall Precautions.  Frequent reorientation to surroundings.  Secondary Diagnosis:	UTI (urinary tract infection)  Assessment and plan of treatment:	Mild - Asymptomatic. No treatment.   HOME CARE INSTRUCTIONS  Drink enough water and fluids to keep your urine clear or pale yellow.  Avoid caffeine, tea, and carbonated beverages. They tend to irritate your bladder.  Empty your bladder often. Avoid holding urine for long periods of time.  After a bowel movement, women should cleanse from front to back. Use each tissue only once.  SEEK MEDICAL CARE IF:  You have back pain.  You develop a fever.  Your symptoms do not begin to resolve within 3 days.  SEEK IMMEDIATE MEDICAL CARE IF:  You have severe back pain or lower abdominal pain.  You develop chills.  You have nausea or vomiting.  You have continued burning or discomfort with urination.  Secondary Diagnosis:	Essential hypertension  Assessment and plan of treatment:	Take your medication as prescribed.  Follow up with your medical doctor for routine blood pressure monitoring, and to establish long term blood pressure treatment goals.  Low salt diet  Activity as tolerated.  Notify your doctor if you have any of the following symptoms:   Dizziness, Lightheadedness, Blurry vision, Headache, Chest pain, Shortness of breath  Secondary Diagnosis:	Hyperlipidemia  Assessment and plan of treatment:	Take your medication as prescribed.   Follow up with your medical doctor for routine blood  work monitoring, and to establish long term treatment goals.  Secondary Diagnosis:	Asthma  Assessment and plan of treatment:	Take your medications as prescribed.  Follow up with your PMD within 1 week after discharge from hospital.  Seek emergency care for difficulty breathing.  Secondary Diagnosis:	Alzheimer's dementia without behavioral disturbance, unspecified timing of dementia onset

## 2018-07-16 NOTE — PROGRESS NOTE ADULT - PROBLEM SELECTOR PLAN 3
dementia with AAOx 1-2 at baseline, no behavioral disturbance at this time   high risk for delirium, frequent orientation, avoid benzos, anticholinergics

## 2018-07-16 NOTE — DISCHARGE NOTE ADULT - CARE PROVIDER_API CALL
Sung Delacruz), Family Medicine  [address] [id number]  Phone: [phone]  Fax: [phone] Sung Delacruz), Family Medicine  [address] [place] [address]  Phone: [phone]  Fax: [phone]    Esvin Bee), Cardiology; Internal Medicine; Interventional Cardiology  [address] [place] [address]  Phone: [phone]  Fax: [phone]

## 2018-07-16 NOTE — DISCHARGE NOTE ADULT - HOSPITAL COURSE
Urinalysis Basic - ( 15 Jul 2018 19:03 )    Color: Yellow / Appearance: Clear / S.013 / pH: x  Gluc: x / Ketone: Negative  / Bili: Negative / Urobili: Negative   Blood: x / Protein: Trace / Nitrite: Negative   Leuk Esterase: Small / RBC: 3-5 /HPF / WBC 6-10 /HPF   Sq Epi: x / Non Sq Epi: OCC /HPF / Bacteria: Few /HPF        RADIOLOGY & ADDITIONAL TESTS:    Imaging Personally Reviewed:  [x ] YES  [ ] NO    Assessment and Plan:   · Assessment		  89F with PMH of asthma (no hx of intubations, mild intermittent), CAD s/p CABG X 3, HTN, PPM ([facility]), Afib on Eliquis, dementia (AAOx1-2) presents from [facility] for unwitnessed fall, admitted for syncope work-up.      Problem/Plan - 1:  ·  Problem: Syncope and collapse.  Plan: pt with unwitnessed fall. Suspect mechanical fall as daughter at bedside states that pt tends to wander at night. Cannot r/o syncope    - CT head negative without acute findings   - NSR on tele  - hs trop negative   - will monitor on tele  - check TTE  - PT eval no skilled PT needs  - PPM eval by EP.      Problem/Plan - 2:  ·  Problem: Gait disturbance.  Plan: pt with possible mechanical fall   - seen by PT, no skilled needs.      Problem/Plan - 3:  ·  Problem: Alzheimer's dementia without behavioral disturbance, unspecified timing of dementia onset.  Plan: dementia with AAOx 1-2 at baseline, no behavioral disturbance at this time   high risk for delirium, frequent orientation, avoid benzos, anticholinergics.      Problem/Plan - 4:  ·  Problem: Atrial fibrillation, unspecified type.  Plan: Afib, s/p PPM, on cardizem and eliquis for AC  - EKG with NSR, 1 degree AV block  - will c/w home medications.      Problem/Plan - 5:  ·  Problem: CAD (coronary artery disease).  Plan: s/p CABG, c/w statin  hs trop negative x2.   c/w Eliquis.      Problem/Plan - 6:  Problem: Essential hypertension. Plan: c/w losartan diltiazem at home doses, with hold parameters, monitor BP q8h  holding HCTZ given orthostatic hypotension risk in elderly.     Problem/Plan - 7:  ·  Problem: Mild intermittent asthma without complication.  Plan: no wheezing on exam, does not appear to be exacerbated   duonebs prn.      Problem/Plan - 8:  ·  Problem: Need for prophylactic measure.  Plan: therapeutic on eliquis  fall and aspiration precaution 89F with PMH of asthma (no hx of intubations, mild intermittent), CAD s/p CABG X 3, HTN, PPM ([facility]), Afib on Eliquis, dementia (AAOx1-2) presents from [facility] for unwitnessed fall, admitted for syncope work-up.   Syncope and collapse.  Plan: pt with unwitnessed fall. Suspect mechanical fall as daughter at bedside states that pt tends to wander at night. Cannot r/o syncope    - CT head negative without acute findings   - NSR on tele  - Hs trop negative   - Monitored  on tele  - Checked  TTE - Normal.   - PT eval no skilled PT needs  - PPM eval by EP.   Gait disturbance.  Plan: pt with possible mechanical fall   - seen by PT, no skilled needs.   Alzheimer's dementia without behavioral disturbance, unspecified timing of dementia onset.  Plan: dementia with AAOx 1-2 at baseline, no behavioral disturbance at this time   high risk for delirium, frequent orientation, avoided  benzos, anticholinergics.     Atrial fibrillation, unspecified type.  Plan: Afib, s/p PPM, on Cardizem and eliquis for AC  EKG with NSR, 1 degree AV block  Continued  home medications.   CAD (coronary artery disease).  Plan: s/p CABG, c/w statin  hs trop negative x2.   c/w Eliquis.   Essential hypertension. Plan: c/w losartan diltiazem at home doses, with hold parameters, monitor BP q8h  Holding HCTZ given orthostatic hypotension risk in elderly.    Mild intermittent asthma without complication.  Plan: no wheezing on exam, does not appear to be exacerbated   Duonebs prn.   Need for prophylactic measure.  Plan: therapeutic on Eliquis  Fall and aspiration precautions.  Pt stable for discharge. To follow up with Dr Delacruz within next 3 to 5 days.

## 2018-07-16 NOTE — CONSULT NOTE ADULT - ASSESSMENT
labs ok, tele nsr, apcs  uti - asymptomatic  needs PT eval,  needs PPM check  chk orthostatics BP  if above , nl , can be dc back to [facility]

## 2018-07-16 NOTE — DISCHARGE NOTE ADULT - MEDICATION SUMMARY - MEDICATIONS TO CHANGE
I will SWITCH the dose or number of times a day I take the medications listed below when I get home from the hospital:    Melatonin 5 mg oral tablet  -- 1 tab(s) by mouth once a day (at bedtime)

## 2018-07-16 NOTE — PROGRESS NOTE ADULT - PROBLEM SELECTOR PLAN 1
pt with unwitnessed fall. Suspect mechanical fall as daughter at bedside states that pt tends to wander at night. Cannot r/o syncope    - CT head negative without acute findings   - NSR on tele  - hs trop negative   - will monitor on tele  - check TTE  - PT eval no skilled PT needs  - PPM eval by EP

## 2018-07-16 NOTE — DISCHARGE NOTE ADULT - MEDICATION SUMMARY - MEDICATIONS TO TAKE
I will START or STAY ON the medications listed below when I get home from the hospital:    Mapap 500 mg oral tablet  -- 2 tab(s) by mouth every 6 hours, As Needed for pain  -- Indication: For Pain    losartan 100 mg oral tablet  -- 1 tab(s) by mouth once a day  -- Indication: For Essential hypertension    dilTIAZem 240 mg/24 hours oral capsule, extended release  -- 1 cap(s) by mouth once a day  -- Indication: For Essential hypertension    apixaban 2.5 mg oral tablet  -- 1 tab(s) by mouth every 12 hours  -- Indication: For Atrial fibrillation, unspecified type    escitalopram 5 mg oral tablet  -- 1 tab(s) by mouth once a day  -- Indication: For Mood    simvastatin 20 mg oral tablet  -- 1 tab(s) by mouth once a day (at bedtime)  -- Indication: For Cholesterol    Vytorin 10 mg-20 mg oral tablet  -- 1 tab(s) by mouth every other day  -- Indication: For Cholesterol    Hyzaar 100 mg-12.5 mg oral tablet  -- 1 tab(s) by mouth once a day  -- Indication: For Essential hypertension    DuoNeb 0.5 mg-2.5 mg/3 mL inhalation solution  -- 3 milliliter(s) inhaled 2 times a day as needed for SOB  -- Indication: For Bronchodilator    ProAir HFA 90 mcg/inh inhalation aerosol  -- 1 puff(s) inhaled every 6 hours, As Needed - for shortness of breath and/or wheezing  -- Indication: For Bronchodilator    Melatonin 3 mg oral tablet  -- 1 tab(s) by mouth once (at bedtime)  -- Indication: For Sleep aid    Calcium 600+D 600 mg-200 intl units oral tablet  -- 1 tab(s) by mouth once a day  -- Indication: For Supplement I will START or STAY ON the medications listed below when I get home from the hospital:    Mapap 500 mg oral tablet  -- 2 tab(s) by mouth every 6 hours, As Needed for pain  -- Indication: For Pain    dilTIAZem 240 mg/24 hours oral capsule, extended release  -- 1 cap(s) by mouth once a day  -- Indication: For Essential hypertension    apixaban 2.5 mg oral tablet  -- 1 tab(s) by mouth every 12 hours  -- Indication: For Atrial fibrillation, unspecified type    escitalopram 5 mg oral tablet  -- 1 tab(s) by mouth once a day  -- Indication: For Mood    Vytorin 10 mg-20 mg oral tablet  -- 1 tab(s) by mouth every other day  -- Indication: For Cholesterol    Hyzaar 100 mg-12.5 mg oral tablet  -- 1 tab(s) by mouth once a day  -- Indication: For Essential hypertension    DuoNeb 0.5 mg-2.5 mg/3 mL inhalation solution  -- 3 milliliter(s) inhaled 2 times a day as needed for SOB  -- Indication: For Bronchodilator    ProAir HFA 90 mcg/inh inhalation aerosol  -- 1 puff(s) inhaled every 6 hours, As Needed - for shortness of breath and/or wheezing  -- Indication: For Bronchodilator    Melatonin 3 mg oral tablet  -- 1 tab(s) by mouth once (at bedtime)  -- Indication: For Sleep aid    Calcium 600+D 600 mg-200 intl units oral tablet  -- 1 tab(s) by mouth once a day  -- Indication: For Supplement

## 2018-07-16 NOTE — PHYSICAL THERAPY INITIAL EVALUATION ADULT - BALANCE TRAINING, PT EVAL
GOAL: Pt will demonstrate improved static/dynamic standing balance to good, in order to improve stability, decrease fall risk and  with ADLs within 4 weeks.

## 2018-07-16 NOTE — PROGRESS NOTE ADULT - ATTENDING COMMENTS
Should Echo and PPM eval be normal and be able to be performed today, would plan for discharge back to [facility]

## 2018-07-16 NOTE — DISCHARGE NOTE ADULT - PATIENT PORTAL LINK FT
You can access the [facility] Patient Portal, offered by [facility], by registering with the following website: [email or web address]

## 2018-07-16 NOTE — PHYSICAL THERAPY INITIAL EVALUATION ADULT - PERTINENT HX OF CURRENT PROBLEM, REHAB EVAL
89F with PMH of asthma (no hx of intubations, mild intermittent), CAD s/p CABG X 3, HTN, PPM ([facility]), Afib on Eliquis, dementia (AAOx1-2) presents from [facility] for unwitnessed fall. Pt does not recall fall and unable to provide history - per ED chart, pt found s/p fall, unwitnessed and cannot rule out syncop

## 2018-07-16 NOTE — DISCHARGE NOTE ADULT - PLAN OF CARE
No further episodes of Syncope and Collapse HOME CARE INSTRUCTIONS  Have someone stay with you until you feel stable.  Do not drive, operate machinery, or play sports until your caregiver says it is okay.  Keep all follow-up appointments as directed by your caregiver.   Lie down right away if you start feeling like you might faint. Breathe deeply and steadily. Wait until all the symptoms have passed.Drink enough fluids to keep your urine clear or pale yellow.  If you are taking blood pressure or heart medicine, get up slowly, taking several minutes to sit and then stand. This can reduce dizziness.  SEEK IMMEDIATE MEDICAL CARE IF:  You have a severe headache.  You have unusual pain in the chest, abdomen, or back.  You are bleeding from the mouth or rectum, or you have black or tarry stool.  You have an irregular or very fast heartbeat.  You have pain with breathing.  You have repeated fainting or seizure-like jerking during an episode.  You faint when sitting or lying down.  You have confusion.  You have difficulty walking.  You have severe weakness.  You have vision problems.  If you fainted, call your local emergency services ( [phone] ). Do not drive yourself to the hospital Safety Precautions - including Fall Precautions.  Frequent reorientation to surroundings. Take your medication as prescribed.  Follow up with your medical doctor for routine blood pressure monitoring, and to establish long term blood pressure treatment goals.  Low salt diet  Activity as tolerated.  Notify your doctor if you have any of the following symptoms:   Dizziness, Lightheadedness, Blurry vision, Headache, Chest pain, Shortness of breath Take your medication as prescribed.   Follow up with your medical doctor for routine blood  work monitoring, and to establish long term treatment goals. Take your medications as prescribed.  Follow up with your PMD within 1 week after discharge from hospital.  Seek emergency care for difficulty breathing. Mild - Asymptomatic. No treatment.   HOME CARE INSTRUCTIONS  Drink enough water and fluids to keep your urine clear or pale yellow.  Avoid caffeine, tea, and carbonated beverages. They tend to irritate your bladder.  Empty your bladder often. Avoid holding urine for long periods of time.  After a bowel movement, women should cleanse from front to back. Use each tissue only once.  SEEK MEDICAL CARE IF:  You have back pain.  You develop a fever.  Your symptoms do not begin to resolve within 3 days.  SEEK IMMEDIATE MEDICAL CARE IF:  You have severe back pain or lower abdominal pain.  You develop chills.  You have nausea or vomiting.  You have continued burning or discomfort with urination.

## 2018-07-16 NOTE — DISCHARGE NOTE ADULT - SECONDARY DIAGNOSIS.
Fall UTI (urinary tract infection) Essential hypertension Hyperlipidemia Asthma Alzheimer's dementia without behavioral disturbance, unspecified timing of dementia onset

## 2018-12-21 ENCOUNTER — EMERGENCY (EMERGENCY)
Facility: HOSPITAL | Age: 83
LOS: 1 days | Discharge: HOME | End: 2018-12-21
Attending: EMERGENCY MEDICINE | Admitting: EMERGENCY MEDICINE
Payer: MEDICARE

## 2018-12-21 VITALS
SYSTOLIC BLOOD PRESSURE: 175 MMHG | DIASTOLIC BLOOD PRESSURE: 66 MMHG | OXYGEN SATURATION: 96 % | RESPIRATION RATE: 18 BRPM | TEMPERATURE: 98 F | HEART RATE: 60 BPM

## 2018-12-21 VITALS
TEMPERATURE: 98 F | RESPIRATION RATE: 20 BRPM | DIASTOLIC BLOOD PRESSURE: 79 MMHG | SYSTOLIC BLOOD PRESSURE: 126 MMHG | OXYGEN SATURATION: 95 % | HEART RATE: 78 BPM | WEIGHT: 139.99 LBS

## 2018-12-21 DIAGNOSIS — Z95.1 PRESENCE OF AORTOCORONARY BYPASS GRAFT: Chronic | ICD-10-CM

## 2018-12-21 DIAGNOSIS — K63.5 POLYP OF COLON: Chronic | ICD-10-CM

## 2018-12-21 DIAGNOSIS — Z95.0 PRESENCE OF CARDIAC PACEMAKER: Chronic | ICD-10-CM

## 2018-12-21 LAB
ALBUMIN SERPL ELPH-MCNC: 3.2 G/DL — LOW (ref 3.3–5)
ALP SERPL-CCNC: 106 U/L — SIGNIFICANT CHANGE UP (ref 40–120)
ALT FLD-CCNC: 21 U/L — SIGNIFICANT CHANGE UP (ref 12–78)
ANION GAP SERPL CALC-SCNC: 6 MMOL/L — SIGNIFICANT CHANGE UP (ref 5–17)
APTT BLD: 36.5 SEC — HIGH (ref 27.5–36.3)
AST SERPL-CCNC: 14 U/L — LOW (ref 15–37)
BILIRUB SERPL-MCNC: 0.2 MG/DL — SIGNIFICANT CHANGE UP (ref 0.2–1.2)
BUN SERPL-MCNC: 32 MG/DL — HIGH (ref 7–23)
CALCIUM SERPL-MCNC: 9 MG/DL — SIGNIFICANT CHANGE UP (ref 8.5–10.1)
CHLORIDE SERPL-SCNC: 104 MMOL/L — SIGNIFICANT CHANGE UP (ref 96–108)
CO2 SERPL-SCNC: 32 MMOL/L — HIGH (ref 22–31)
CREAT SERPL-MCNC: 1.1 MG/DL — SIGNIFICANT CHANGE UP (ref 0.5–1.3)
GLUCOSE SERPL-MCNC: 101 MG/DL — HIGH (ref 70–99)
HCT VFR BLD CALC: 36.9 % — SIGNIFICANT CHANGE UP (ref 34.5–45)
HGB BLD-MCNC: 11.7 G/DL — SIGNIFICANT CHANGE UP (ref 11.5–15.5)
INR BLD: 1.32 RATIO — HIGH (ref 0.88–1.16)
MAGNESIUM SERPL-MCNC: 2.3 MG/DL — SIGNIFICANT CHANGE UP (ref 1.6–2.6)
MCHC RBC-ENTMCNC: 28.1 PG — SIGNIFICANT CHANGE UP (ref 27–34)
MCHC RBC-ENTMCNC: 31.7 GM/DL — LOW (ref 32–36)
MCV RBC AUTO: 88.7 FL — SIGNIFICANT CHANGE UP (ref 80–100)
NRBC # BLD: 0 /100 WBCS — SIGNIFICANT CHANGE UP (ref 0–0)
NT-PROBNP SERPL-SCNC: 653 PG/ML — HIGH (ref 0–450)
PLATELET # BLD AUTO: 240 K/UL — SIGNIFICANT CHANGE UP (ref 150–400)
POTASSIUM SERPL-MCNC: 3.4 MMOL/L — LOW (ref 3.5–5.3)
POTASSIUM SERPL-SCNC: 3.4 MMOL/L — LOW (ref 3.5–5.3)
PROT SERPL-MCNC: 7.7 G/DL — SIGNIFICANT CHANGE UP (ref 6–8.3)
PROTHROM AB SERPL-ACNC: 15.1 SEC — HIGH (ref 10–12.9)
RBC # BLD: 4.16 M/UL — SIGNIFICANT CHANGE UP (ref 3.8–5.2)
RBC # FLD: 13.7 % — SIGNIFICANT CHANGE UP (ref 10.3–14.5)
SODIUM SERPL-SCNC: 142 MMOL/L — SIGNIFICANT CHANGE UP (ref 135–145)
TROPONIN I SERPL-MCNC: <.015 NG/ML — SIGNIFICANT CHANGE UP (ref 0.01–0.04)
WBC # BLD: 9.46 K/UL — SIGNIFICANT CHANGE UP (ref 3.8–10.5)
WBC # FLD AUTO: 9.46 K/UL — SIGNIFICANT CHANGE UP (ref 3.8–10.5)

## 2018-12-21 PROCEDURE — 99284 EMERGENCY DEPT VISIT MOD MDM: CPT | Mod: 25

## 2018-12-21 PROCEDURE — 84484 ASSAY OF TROPONIN QUANT: CPT

## 2018-12-21 PROCEDURE — 85027 COMPLETE CBC AUTOMATED: CPT

## 2018-12-21 PROCEDURE — 93005 ELECTROCARDIOGRAM TRACING: CPT

## 2018-12-21 PROCEDURE — 71045 X-RAY EXAM CHEST 1 VIEW: CPT | Mod: 26

## 2018-12-21 PROCEDURE — 99285 EMERGENCY DEPT VISIT HI MDM: CPT | Mod: 25

## 2018-12-21 PROCEDURE — 85610 PROTHROMBIN TIME: CPT

## 2018-12-21 PROCEDURE — 85730 THROMBOPLASTIN TIME PARTIAL: CPT

## 2018-12-21 PROCEDURE — 83880 ASSAY OF NATRIURETIC PEPTIDE: CPT

## 2018-12-21 PROCEDURE — 80053 COMPREHEN METABOLIC PANEL: CPT

## 2018-12-21 PROCEDURE — 71045 X-RAY EXAM CHEST 1 VIEW: CPT

## 2018-12-21 PROCEDURE — 83735 ASSAY OF MAGNESIUM: CPT

## 2018-12-21 PROCEDURE — 36415 COLL VENOUS BLD VENIPUNCTURE: CPT

## 2018-12-21 RX ORDER — POTASSIUM CHLORIDE 20 MEQ
40 PACKET (EA) ORAL ONCE
Qty: 0 | Refills: 0 | Status: COMPLETED | OUTPATIENT
Start: 2018-12-21 | End: 2018-12-21

## 2018-12-21 RX ADMIN — Medication 40 MILLIEQUIVALENT(S): at 19:25

## 2018-12-21 RX ADMIN — Medication 20 MILLIGRAM(S): at 19:25

## 2018-12-21 NOTE — ED PROVIDER NOTE - OBJECTIVE STATEMENT
bib ems hx of asthma co cp for 30 minutes, ems gave an admin of albuterol via neb and pt reports sx are better. bib ems hx of asthma co cp for 30 minutes, ems gave an admin of albuterol via neb and pt reports sx are better. and currently has no complaint pmd dr rey hx of dementia alzhemiers depression hytperension cancer colon copd afib  mar denotes eliquis diltizemduonebs losartan  colon cancer bib ems hx of asthma co cp for 30 minutes, ems gave an admin of albuterol via neb and pt reports sx are better. and currently has no complaint pmd dr rey hx of dementia alzhemiers depression hytperension cancer colon copd afib  mar denotes eliquis diltizem duonebs losartan  colon cancer

## 2018-12-21 NOTE — ED PROVIDER NOTE - PROGRESS NOTE DETAILS
pt doing well will treat as copd exacerbation,  po dose of prednisone follow upwti pmd and dc back to assisted lving

## 2019-01-22 ENCOUNTER — EMERGENCY (EMERGENCY)
Facility: HOSPITAL | Age: 84
LOS: 1 days | Discharge: HOME | End: 2019-01-22
Attending: STUDENT IN AN ORGANIZED HEALTH CARE EDUCATION/TRAINING PROGRAM | Admitting: STUDENT IN AN ORGANIZED HEALTH CARE EDUCATION/TRAINING PROGRAM
Payer: MEDICARE

## 2019-01-22 VITALS
OXYGEN SATURATION: 99 % | SYSTOLIC BLOOD PRESSURE: 177 MMHG | HEART RATE: 61 BPM | RESPIRATION RATE: 18 BRPM | DIASTOLIC BLOOD PRESSURE: 74 MMHG

## 2019-01-22 DIAGNOSIS — K63.5 POLYP OF COLON: Chronic | ICD-10-CM

## 2019-01-22 DIAGNOSIS — Z95.1 PRESENCE OF AORTOCORONARY BYPASS GRAFT: Chronic | ICD-10-CM

## 2019-01-22 DIAGNOSIS — Z95.0 PRESENCE OF CARDIAC PACEMAKER: Chronic | ICD-10-CM

## 2019-01-22 PROCEDURE — 99284 EMERGENCY DEPT VISIT MOD MDM: CPT

## 2019-01-22 RX ORDER — DILTIAZEM HCL 120 MG
1 CAPSULE, EXT RELEASE 24 HR ORAL
Qty: 0 | Refills: 0 | COMMUNITY

## 2019-01-22 RX ORDER — ALBUTEROL 90 UG/1
0 AEROSOL, METERED ORAL
Qty: 0 | Refills: 0 | COMMUNITY

## 2019-01-22 RX ORDER — LANOLIN ALCOHOL/MO/W.PET/CERES
1 CREAM (GRAM) TOPICAL
Qty: 0 | Refills: 0 | COMMUNITY

## 2019-01-22 RX ORDER — ESCITALOPRAM OXALATE 10 MG/1
0 TABLET, FILM COATED ORAL
Qty: 0 | Refills: 0 | COMMUNITY

## 2019-01-22 RX ORDER — LOSARTAN POTASSIUM 100 MG/1
0 TABLET, FILM COATED ORAL
Qty: 0 | Refills: 0 | COMMUNITY

## 2019-01-22 NOTE — ED ADULT NURSE NOTE - PMH
Afib    Anxiety    ASHD (arteriosclerotic heart disease)    Asthma    Asthma    CAD (coronary artery disease)    COPD (chronic obstructive pulmonary disease)    Dementia    Hyperlipidemia    Hypertension

## 2019-01-22 NOTE — ED ADULT NURSE NOTE - NSIMPLEMENTINTERV_GEN_ALL_ED
Implemented All Universal Safety Interventions:  [place] to call system. Call bell, personal items and telephone within reach. Instruct patient to call for assistance. Room bathroom lighting operational. Non-slip footwear when patient is off stretcher. Physically safe environment: no spills, clutter or unnecessary equipment. Stretcher in lowest position, wheels locked, appropriate side rails in place.

## 2019-01-22 NOTE — ED ADULT NURSE NOTE - CHIEF COMPLAINT QUOTE
patient sent from [facility] dementia unit found on the floor, c/o lower back pain

## 2019-01-22 NOTE — ED ADULT NURSE NOTE - OBJECTIVE STATEMENT
Patient brought in by ambulance from [facility] as reported was found lying on the floor complaining of lower back pain has history of dementia waiting for MD evaluation.

## 2019-01-22 NOTE — ED ADULT TRIAGE NOTE - CHIEF COMPLAINT QUOTE
patient sent from [facility] dementia unit found on the floor, c/o lower back pain

## 2019-01-23 VITALS
DIASTOLIC BLOOD PRESSURE: 76 MMHG | TEMPERATURE: 98 F | HEART RATE: 60 BPM | OXYGEN SATURATION: 99 % | SYSTOLIC BLOOD PRESSURE: 176 MMHG | RESPIRATION RATE: 18 BRPM

## 2019-01-23 PROBLEM — J45.909 UNSPECIFIED ASTHMA, UNCOMPLICATED: Chronic | Status: ACTIVE | Noted: 2018-12-21

## 2019-01-23 PROBLEM — F03.90 UNSPECIFIED DEMENTIA WITHOUT BEHAVIORAL DISTURBANCE: Chronic | Status: ACTIVE | Noted: 2018-12-21

## 2019-01-23 PROBLEM — J44.9 CHRONIC OBSTRUCTIVE PULMONARY DISEASE, UNSPECIFIED: Chronic | Status: ACTIVE | Noted: 2018-12-21

## 2019-01-23 PROBLEM — I48.91 UNSPECIFIED ATRIAL FIBRILLATION: Chronic | Status: ACTIVE | Noted: 2018-12-21

## 2019-01-23 PROCEDURE — 70450 CT HEAD/BRAIN W/O DYE: CPT

## 2019-01-23 PROCEDURE — 99284 EMERGENCY DEPT VISIT MOD MDM: CPT | Mod: 25

## 2019-01-23 PROCEDURE — 72110 X-RAY EXAM L-2 SPINE 4/>VWS: CPT

## 2019-01-23 PROCEDURE — 72110 X-RAY EXAM L-2 SPINE 4/>VWS: CPT | Mod: 26

## 2019-01-23 PROCEDURE — 70450 CT HEAD/BRAIN W/O DYE: CPT | Mod: 26

## 2019-01-23 RX ORDER — ACETAMINOPHEN 500 MG
650 TABLET ORAL ONCE
Qty: 0 | Refills: 0 | Status: COMPLETED | OUTPATIENT
Start: 2019-01-23 | End: 2019-01-23

## 2019-01-23 RX ADMIN — Medication 650 MILLIGRAM(S): at 00:48

## 2019-01-23 RX ADMIN — Medication 650 MILLIGRAM(S): at 00:20

## 2019-01-23 NOTE — ED ADULT NURSE REASSESSMENT NOTE - NS ED NURSE REASSESS COMMENT FT1
PAtient picked up by molly to be brought to [facility] spoke with Rimma MOSQUEDA advised of patient's returm.

## 2019-01-23 NOTE — ED ADULT NURSE REASSESSMENT NOTE - NS ED NURSE REASSESS COMMENT FT1
Patient brought back from CT scan awaiting result; daughter called and advised she's on her way to the hospital.

## 2019-01-23 NOTE — ED PROVIDER NOTE - MUSCULOSKELETAL, MLM
Spine appears normal, range of motion is not limited, no muscle or joint tenderness. Hips and pelvis stable, full ROM of b/L UE and LE.  No ecchymosis, step-off or deformity to T/L/S spine

## 2019-01-23 NOTE — ED PROVIDER NOTE - PROGRESS NOTE DETAILS
Family at bedside, copy of CT given.  Patient denies any complaints Family at bedside, copy of CT given.  Patient denies any complaints. Daughter will take patient back to NH

## 2019-01-23 NOTE — ED PROVIDER NOTE - OBJECTIVE STATEMENT
89 year old female with a history of COPD, a-fib, dementia, HLD, HTN, PM, CAD, CABG presents with unwitnessed fall in nursing home, [facility].  Patient is on Eliquis.  She does not recall the fall but was found on the floor awake and alert.  C/o mild lower back pain.  She is DNR, PMD Dr. Danielle

## 2019-01-23 NOTE — ED PROVIDER NOTE - MEDICAL DECISION MAKING DETAILS
89 year old female with unwitnessed fall in NH, on eliquis.  CT head, x-ray LS spine, analgesia, reassess

## 2019-01-23 NOTE — ED PROVIDER NOTE - CARE PLAN
Principal Discharge DX:	Fall from bed, initial encounter Principal Discharge DX:	Fall from bed, initial encounter  Secondary Diagnosis:	Injury of head, initial encounter

## 2019-02-07 NOTE — PHYSICAL THERAPY INITIAL EVALUATION ADULT - PERSONAL SAFETY AND JUDGMENT, REHAB EVAL
impaired Excisional Biopsy Additional Text (Leave Blank If You Do Not Want): The margin was drawn around the clinically apparent lesion. An elliptical shape was then drawn on the skin incorporating the lesion and margins.  Incisions were then made along these lines to the appropriate tissue plane and the lesion was extirpated.

## 2019-04-30 ENCOUNTER — EMERGENCY (EMERGENCY)
Facility: HOSPITAL | Age: 84
LOS: 1 days | Discharge: HOME | End: 2019-04-30
Attending: EMERGENCY MEDICINE | Admitting: EMERGENCY MEDICINE
Payer: MEDICARE

## 2019-04-30 VITALS
DIASTOLIC BLOOD PRESSURE: 61 MMHG | TEMPERATURE: 98 F | SYSTOLIC BLOOD PRESSURE: 156 MMHG | RESPIRATION RATE: 16 BRPM | HEIGHT: 60 IN | WEIGHT: 136.03 LBS | HEART RATE: 60 BPM | OXYGEN SATURATION: 97 %

## 2019-04-30 VITALS
SYSTOLIC BLOOD PRESSURE: 150 MMHG | OXYGEN SATURATION: 98 % | HEART RATE: 67 BPM | RESPIRATION RATE: 16 BRPM | DIASTOLIC BLOOD PRESSURE: 74 MMHG | TEMPERATURE: 98 F

## 2019-04-30 DIAGNOSIS — K63.5 POLYP OF COLON: Chronic | ICD-10-CM

## 2019-04-30 DIAGNOSIS — Z95.0 PRESENCE OF CARDIAC PACEMAKER: Chronic | ICD-10-CM

## 2019-04-30 DIAGNOSIS — Z95.1 PRESENCE OF AORTOCORONARY BYPASS GRAFT: Chronic | ICD-10-CM

## 2019-04-30 LAB
ALBUMIN SERPL ELPH-MCNC: 3.3 G/DL — SIGNIFICANT CHANGE UP (ref 3.3–5)
ALP SERPL-CCNC: 103 U/L — SIGNIFICANT CHANGE UP (ref 40–120)
ALT FLD-CCNC: 30 U/L — SIGNIFICANT CHANGE UP (ref 12–78)
ANION GAP SERPL CALC-SCNC: 8 MMOL/L — SIGNIFICANT CHANGE UP (ref 5–17)
APPEARANCE UR: ABNORMAL
AST SERPL-CCNC: 31 U/L — SIGNIFICANT CHANGE UP (ref 15–37)
BACTERIA # UR AUTO: ABNORMAL
BASOPHILS # BLD AUTO: 0.09 K/UL — SIGNIFICANT CHANGE UP (ref 0–0.2)
BASOPHILS NFR BLD AUTO: 1.2 % — SIGNIFICANT CHANGE UP (ref 0–2)
BILIRUB SERPL-MCNC: 0.3 MG/DL — SIGNIFICANT CHANGE UP (ref 0.2–1.2)
BILIRUB UR-MCNC: NEGATIVE — SIGNIFICANT CHANGE UP
BUN SERPL-MCNC: 22 MG/DL — SIGNIFICANT CHANGE UP (ref 7–23)
CALCIUM SERPL-MCNC: 9.3 MG/DL — SIGNIFICANT CHANGE UP (ref 8.5–10.1)
CHLORIDE SERPL-SCNC: 102 MMOL/L — SIGNIFICANT CHANGE UP (ref 96–108)
CO2 SERPL-SCNC: 29 MMOL/L — SIGNIFICANT CHANGE UP (ref 22–31)
COLOR SPEC: YELLOW — SIGNIFICANT CHANGE UP
CREAT SERPL-MCNC: 0.99 MG/DL — SIGNIFICANT CHANGE UP (ref 0.5–1.3)
DIFF PNL FLD: NEGATIVE — SIGNIFICANT CHANGE UP
EOSINOPHIL # BLD AUTO: 0.22 K/UL — SIGNIFICANT CHANGE UP (ref 0–0.5)
EOSINOPHIL NFR BLD AUTO: 2.9 % — SIGNIFICANT CHANGE UP (ref 0–6)
EPI CELLS # UR: ABNORMAL
GLUCOSE SERPL-MCNC: 95 MG/DL — SIGNIFICANT CHANGE UP (ref 70–99)
GLUCOSE UR QL: NEGATIVE — SIGNIFICANT CHANGE UP
HCT VFR BLD CALC: 40.3 % — SIGNIFICANT CHANGE UP (ref 34.5–45)
HGB BLD-MCNC: 13.1 G/DL — SIGNIFICANT CHANGE UP (ref 11.5–15.5)
IMM GRANULOCYTES NFR BLD AUTO: 0.3 % — SIGNIFICANT CHANGE UP (ref 0–1.5)
KETONES UR-MCNC: NEGATIVE — SIGNIFICANT CHANGE UP
LACTATE SERPL-SCNC: 0.7 MMOL/L — SIGNIFICANT CHANGE UP (ref 0.7–2)
LEUKOCYTE ESTERASE UR-ACNC: ABNORMAL
LYMPHOCYTES # BLD AUTO: 1.53 K/UL — SIGNIFICANT CHANGE UP (ref 1–3.3)
LYMPHOCYTES # BLD AUTO: 19.9 % — SIGNIFICANT CHANGE UP (ref 13–44)
MCHC RBC-ENTMCNC: 28.4 PG — SIGNIFICANT CHANGE UP (ref 27–34)
MCHC RBC-ENTMCNC: 32.5 GM/DL — SIGNIFICANT CHANGE UP (ref 32–36)
MCV RBC AUTO: 87.2 FL — SIGNIFICANT CHANGE UP (ref 80–100)
MONOCYTES # BLD AUTO: 0.88 K/UL — SIGNIFICANT CHANGE UP (ref 0–0.9)
MONOCYTES NFR BLD AUTO: 11.4 % — SIGNIFICANT CHANGE UP (ref 2–14)
NEUTROPHILS # BLD AUTO: 4.95 K/UL — SIGNIFICANT CHANGE UP (ref 1.8–7.4)
NEUTROPHILS NFR BLD AUTO: 64.3 % — SIGNIFICANT CHANGE UP (ref 43–77)
NITRITE UR-MCNC: NEGATIVE — SIGNIFICANT CHANGE UP
NRBC # BLD: 0 /100 WBCS — SIGNIFICANT CHANGE UP (ref 0–0)
PH UR: 6 — SIGNIFICANT CHANGE UP (ref 5–8)
PLATELET # BLD AUTO: 283 K/UL — SIGNIFICANT CHANGE UP (ref 150–400)
POTASSIUM SERPL-MCNC: 4.2 MMOL/L — SIGNIFICANT CHANGE UP (ref 3.5–5.3)
POTASSIUM SERPL-SCNC: 4.2 MMOL/L — SIGNIFICANT CHANGE UP (ref 3.5–5.3)
PROT SERPL-MCNC: 7.7 G/DL — SIGNIFICANT CHANGE UP (ref 6–8.3)
PROT UR-MCNC: NEGATIVE — SIGNIFICANT CHANGE UP
RBC # BLD: 4.62 M/UL — SIGNIFICANT CHANGE UP (ref 3.8–5.2)
RBC # FLD: 13.2 % — SIGNIFICANT CHANGE UP (ref 10.3–14.5)
RBC CASTS # UR COMP ASSIST: SIGNIFICANT CHANGE UP /HPF (ref 0–4)
SODIUM SERPL-SCNC: 139 MMOL/L — SIGNIFICANT CHANGE UP (ref 135–145)
SP GR SPEC: 1 — LOW (ref 1.01–1.02)
UROBILINOGEN FLD QL: NEGATIVE — SIGNIFICANT CHANGE UP
WBC # BLD: 7.69 K/UL — SIGNIFICANT CHANGE UP (ref 3.8–10.5)
WBC # FLD AUTO: 7.69 K/UL — SIGNIFICANT CHANGE UP (ref 3.8–10.5)
WBC UR QL: SIGNIFICANT CHANGE UP

## 2019-04-30 PROCEDURE — 80053 COMPREHEN METABOLIC PANEL: CPT

## 2019-04-30 PROCEDURE — 83605 ASSAY OF LACTIC ACID: CPT

## 2019-04-30 PROCEDURE — 99284 EMERGENCY DEPT VISIT MOD MDM: CPT | Mod: 25

## 2019-04-30 PROCEDURE — 82962 GLUCOSE BLOOD TEST: CPT

## 2019-04-30 PROCEDURE — 71045 X-RAY EXAM CHEST 1 VIEW: CPT

## 2019-04-30 PROCEDURE — 71045 X-RAY EXAM CHEST 1 VIEW: CPT | Mod: 26

## 2019-04-30 PROCEDURE — 85027 COMPLETE CBC AUTOMATED: CPT

## 2019-04-30 PROCEDURE — 87086 URINE CULTURE/COLONY COUNT: CPT

## 2019-04-30 PROCEDURE — 81001 URINALYSIS AUTO W/SCOPE: CPT

## 2019-04-30 PROCEDURE — 36415 COLL VENOUS BLD VENIPUNCTURE: CPT

## 2019-04-30 PROCEDURE — 96360 HYDRATION IV INFUSION INIT: CPT

## 2019-04-30 PROCEDURE — 87040 BLOOD CULTURE FOR BACTERIA: CPT

## 2019-04-30 RX ORDER — ALBUTEROL 90 UG/1
2 AEROSOL, METERED ORAL
Qty: 0 | Refills: 0 | COMMUNITY

## 2019-04-30 RX ORDER — LOSARTAN/HYDROCHLOROTHIAZIDE 100MG-25MG
1 TABLET ORAL
Qty: 0 | Refills: 0 | COMMUNITY

## 2019-04-30 RX ORDER — EZETIMIBE AND SIMVASTATIN 10; 80 MG/1; MG/1
1 TABLET, FILM COATED ORAL
Qty: 0 | Refills: 0 | COMMUNITY

## 2019-04-30 RX ORDER — IPRATROPIUM/ALBUTEROL SULFATE 18-103MCG
3 AEROSOL WITH ADAPTER (GRAM) INHALATION
Qty: 0 | Refills: 0 | COMMUNITY

## 2019-04-30 RX ORDER — IPRATROPIUM/ALBUTEROL SULFATE 18-103MCG
0 AEROSOL WITH ADAPTER (GRAM) INHALATION
Qty: 0 | Refills: 0 | COMMUNITY

## 2019-04-30 RX ORDER — APIXABAN 2.5 MG/1
1 TABLET, FILM COATED ORAL
Qty: 0 | Refills: 0 | COMMUNITY

## 2019-04-30 RX ORDER — ACETAMINOPHEN 500 MG
2 TABLET ORAL
Qty: 0 | Refills: 0 | COMMUNITY

## 2019-04-30 RX ORDER — ALBUTEROL 90 UG/1
1 AEROSOL, METERED ORAL
Qty: 0 | Refills: 0 | COMMUNITY

## 2019-04-30 RX ORDER — SODIUM CHLORIDE 9 MG/ML
1000 INJECTION INTRAMUSCULAR; INTRAVENOUS; SUBCUTANEOUS ONCE
Qty: 0 | Refills: 0 | Status: COMPLETED | OUTPATIENT
Start: 2019-04-30 | End: 2019-04-30

## 2019-04-30 RX ADMIN — SODIUM CHLORIDE 1000 MILLILITER(S): 9 INJECTION INTRAMUSCULAR; INTRAVENOUS; SUBCUTANEOUS at 02:31

## 2019-04-30 RX ADMIN — SODIUM CHLORIDE 1000 MILLILITER(S): 9 INJECTION INTRAMUSCULAR; INTRAVENOUS; SUBCUTANEOUS at 03:24

## 2019-04-30 NOTE — ED PROVIDER NOTE - OBJECTIVE STATEMENT
89yo female bib ems found more confused than normal. according to ems pt was not as responsive during rounds, pt is awake and alert but confused, has no complaints, no fall, no other hx as per NH

## 2019-04-30 NOTE — ED ADULT NURSE NOTE - OBJECTIVE STATEMENT
Patient brought in by ambulance from [facility] as reported was unresponsive in the bed found by the nurse. Patient was awake and alert upon transfer to [facility], no slurred speech, able to move bilateral arms and legs, no facial droop. Was seen and evaluated by MD Ahumada with orders made and carried out blood and urine sent to lab.

## 2019-04-30 NOTE — ED ADULT NURSE NOTE - PMH
Afib    Anxiety    ASHD (arteriosclerotic heart disease)    Asthma    Asthma    CAD (coronary artery disease)    Colon tumor  2019  COPD (chronic obstructive pulmonary disease)    Dementia    Depression    Hyperlipidemia    Hypertension

## 2019-05-02 LAB
CULTURE RESULTS: SIGNIFICANT CHANGE UP
SPECIMEN SOURCE: SIGNIFICANT CHANGE UP

## 2019-05-05 LAB
CULTURE RESULTS: SIGNIFICANT CHANGE UP
CULTURE RESULTS: SIGNIFICANT CHANGE UP
SPECIMEN SOURCE: SIGNIFICANT CHANGE UP
SPECIMEN SOURCE: SIGNIFICANT CHANGE UP

## 2019-07-17 ENCOUNTER — EMERGENCY (EMERGENCY)
Facility: HOSPITAL | Age: 84
LOS: 1 days | Discharge: HOME | End: 2019-07-17
Attending: EMERGENCY MEDICINE | Admitting: EMERGENCY MEDICINE
Payer: MEDICARE

## 2019-07-17 VITALS
TEMPERATURE: 98 F | DIASTOLIC BLOOD PRESSURE: 74 MMHG | SYSTOLIC BLOOD PRESSURE: 154 MMHG | OXYGEN SATURATION: 97 % | HEART RATE: 72 BPM | RESPIRATION RATE: 15 BRPM

## 2019-07-17 VITALS
SYSTOLIC BLOOD PRESSURE: 158 MMHG | HEART RATE: 60 BPM | TEMPERATURE: 98 F | OXYGEN SATURATION: 97 % | WEIGHT: 115.08 LBS | DIASTOLIC BLOOD PRESSURE: 74 MMHG | RESPIRATION RATE: 18 BRPM

## 2019-07-17 DIAGNOSIS — K63.5 POLYP OF COLON: Chronic | ICD-10-CM

## 2019-07-17 DIAGNOSIS — Z95.0 PRESENCE OF CARDIAC PACEMAKER: Chronic | ICD-10-CM

## 2019-07-17 DIAGNOSIS — Z95.1 PRESENCE OF AORTOCORONARY BYPASS GRAFT: Chronic | ICD-10-CM

## 2019-07-17 PROBLEM — D49.0 NEOPLASM OF UNSPECIFIED BEHAVIOR OF DIGESTIVE SYSTEM: Chronic | Status: ACTIVE | Noted: 2019-04-30

## 2019-07-17 PROBLEM — F32.9 MAJOR DEPRESSIVE DISORDER, SINGLE EPISODE, UNSPECIFIED: Chronic | Status: ACTIVE | Noted: 2019-04-30

## 2019-07-17 LAB
ALBUMIN SERPL ELPH-MCNC: 3.1 G/DL — LOW (ref 3.3–5)
ALP SERPL-CCNC: 97 U/L — SIGNIFICANT CHANGE UP (ref 40–120)
ALT FLD-CCNC: 17 U/L — SIGNIFICANT CHANGE UP (ref 12–78)
ANION GAP SERPL CALC-SCNC: 6 MMOL/L — SIGNIFICANT CHANGE UP (ref 5–17)
APPEARANCE UR: ABNORMAL
AST SERPL-CCNC: 12 U/L — LOW (ref 15–37)
BILIRUB SERPL-MCNC: 0.2 MG/DL — SIGNIFICANT CHANGE UP (ref 0.2–1.2)
BILIRUB UR-MCNC: NEGATIVE — SIGNIFICANT CHANGE UP
BUN SERPL-MCNC: 25 MG/DL — HIGH (ref 7–23)
CALCIUM SERPL-MCNC: 8.9 MG/DL — SIGNIFICANT CHANGE UP (ref 8.5–10.1)
CHLORIDE SERPL-SCNC: 106 MMOL/L — SIGNIFICANT CHANGE UP (ref 96–108)
CO2 SERPL-SCNC: 30 MMOL/L — SIGNIFICANT CHANGE UP (ref 22–31)
COLOR SPEC: YELLOW — SIGNIFICANT CHANGE UP
CREAT SERPL-MCNC: 0.97 MG/DL — SIGNIFICANT CHANGE UP (ref 0.5–1.3)
DIFF PNL FLD: ABNORMAL
GLUCOSE SERPL-MCNC: 97 MG/DL — SIGNIFICANT CHANGE UP (ref 70–99)
GLUCOSE UR QL: NEGATIVE — SIGNIFICANT CHANGE UP
HCT VFR BLD CALC: 37 % — SIGNIFICANT CHANGE UP (ref 34.5–45)
HGB BLD-MCNC: 11.9 G/DL — SIGNIFICANT CHANGE UP (ref 11.5–15.5)
KETONES UR-MCNC: NEGATIVE — SIGNIFICANT CHANGE UP
LEUKOCYTE ESTERASE UR-ACNC: ABNORMAL
MCHC RBC-ENTMCNC: 28.5 PG — SIGNIFICANT CHANGE UP (ref 27–34)
MCHC RBC-ENTMCNC: 32.2 GM/DL — SIGNIFICANT CHANGE UP (ref 32–36)
MCV RBC AUTO: 88.7 FL — SIGNIFICANT CHANGE UP (ref 80–100)
NITRITE UR-MCNC: NEGATIVE — SIGNIFICANT CHANGE UP
NRBC # BLD: 0 /100 WBCS — SIGNIFICANT CHANGE UP (ref 0–0)
PH UR: 5 — SIGNIFICANT CHANGE UP (ref 5–8)
PLATELET # BLD AUTO: 233 K/UL — SIGNIFICANT CHANGE UP (ref 150–400)
POTASSIUM SERPL-MCNC: 3.5 MMOL/L — SIGNIFICANT CHANGE UP (ref 3.5–5.3)
POTASSIUM SERPL-SCNC: 3.5 MMOL/L — SIGNIFICANT CHANGE UP (ref 3.5–5.3)
PROT SERPL-MCNC: 7.1 G/DL — SIGNIFICANT CHANGE UP (ref 6–8.3)
PROT UR-MCNC: NEGATIVE — SIGNIFICANT CHANGE UP
RBC # BLD: 4.17 M/UL — SIGNIFICANT CHANGE UP (ref 3.8–5.2)
RBC # FLD: 13.8 % — SIGNIFICANT CHANGE UP (ref 10.3–14.5)
SODIUM SERPL-SCNC: 142 MMOL/L — SIGNIFICANT CHANGE UP (ref 135–145)
SP GR SPEC: 1.01 — SIGNIFICANT CHANGE UP (ref 1.01–1.02)
TROPONIN I SERPL-MCNC: <.015 NG/ML — SIGNIFICANT CHANGE UP (ref 0.01–0.04)
UROBILINOGEN FLD QL: NEGATIVE — SIGNIFICANT CHANGE UP
WBC # BLD: 7.96 K/UL — SIGNIFICANT CHANGE UP (ref 3.8–10.5)
WBC # FLD AUTO: 7.96 K/UL — SIGNIFICANT CHANGE UP (ref 3.8–10.5)

## 2019-07-17 PROCEDURE — 81001 URINALYSIS AUTO W/SCOPE: CPT

## 2019-07-17 PROCEDURE — 36415 COLL VENOUS BLD VENIPUNCTURE: CPT

## 2019-07-17 PROCEDURE — 99285 EMERGENCY DEPT VISIT HI MDM: CPT

## 2019-07-17 PROCEDURE — 87086 URINE CULTURE/COLONY COUNT: CPT

## 2019-07-17 PROCEDURE — 71045 X-RAY EXAM CHEST 1 VIEW: CPT

## 2019-07-17 PROCEDURE — 84484 ASSAY OF TROPONIN QUANT: CPT

## 2019-07-17 PROCEDURE — 80053 COMPREHEN METABOLIC PANEL: CPT

## 2019-07-17 PROCEDURE — 93010 ELECTROCARDIOGRAM REPORT: CPT

## 2019-07-17 PROCEDURE — 93005 ELECTROCARDIOGRAM TRACING: CPT

## 2019-07-17 PROCEDURE — 85027 COMPLETE CBC AUTOMATED: CPT

## 2019-07-17 PROCEDURE — 99284 EMERGENCY DEPT VISIT MOD MDM: CPT | Mod: 25

## 2019-07-17 PROCEDURE — 71045 X-RAY EXAM CHEST 1 VIEW: CPT | Mod: 26

## 2019-07-17 RX ORDER — SODIUM CHLORIDE 9 MG/ML
1000 INJECTION INTRAMUSCULAR; INTRAVENOUS; SUBCUTANEOUS ONCE
Refills: 0 | Status: COMPLETED | OUTPATIENT
Start: 2019-07-17 | End: 2019-07-17

## 2019-07-17 RX ADMIN — SODIUM CHLORIDE 1000 MILLILITER(S): 9 INJECTION INTRAMUSCULAR; INTRAVENOUS; SUBCUTANEOUS at 10:09

## 2019-07-17 NOTE — ED PROVIDER NOTE - CONSTITUTIONAL, MLM
normal... Well appearing, elderly white female, well nourished, awake, alert, oriented to person, place, in no apparent distress.

## 2019-07-17 NOTE — ED PROVIDER NOTE - OBJECTIVE STATEMENT
89 yo white male with H/O Afib    ASHD (arteriosclerotic heart disease)    Asthma    CAD (coronary artery disease)    Colon tumor  2019  COPD (chronic obstructive pulmonary disease)    Dementia    Hyperlipidemia and  Hypertension who presents to ED today by EMS stating that patient was a little weak and fatigued and may of had reduced P.O intake recently. At this time patient denies any fever, chills, nausea, vomiting, diarrhea, cough, chest pain, abdominal pains or melena/BRBPR.

## 2019-07-18 LAB
CULTURE RESULTS: NO GROWTH — SIGNIFICANT CHANGE UP
SPECIMEN SOURCE: SIGNIFICANT CHANGE UP

## 2019-09-06 NOTE — ED ADULT TRIAGE NOTE - HEART RATE (BEATS/MIN)
Family aware to return for persistent fever, development of respiratory distress, change in mental status, decreased UOP, or any other acute medical issue requiring immediate attention.      60

## 2019-09-18 ENCOUNTER — EMERGENCY (EMERGENCY)
Facility: HOSPITAL | Age: 84
LOS: 1 days | Discharge: HOME | End: 2019-09-18
Attending: EMERGENCY MEDICINE | Admitting: EMERGENCY MEDICINE
Payer: MEDICARE

## 2019-09-18 VITALS
OXYGEN SATURATION: 99 % | SYSTOLIC BLOOD PRESSURE: 175 MMHG | HEART RATE: 68 BPM | RESPIRATION RATE: 16 BRPM | DIASTOLIC BLOOD PRESSURE: 76 MMHG | TEMPERATURE: 98 F

## 2019-09-18 VITALS
TEMPERATURE: 98 F | SYSTOLIC BLOOD PRESSURE: 194 MMHG | DIASTOLIC BLOOD PRESSURE: 80 MMHG | RESPIRATION RATE: 17 BRPM | HEIGHT: 65 IN | HEART RATE: 86 BPM | WEIGHT: 160.06 LBS | OXYGEN SATURATION: 97 %

## 2019-09-18 DIAGNOSIS — K63.5 POLYP OF COLON: Chronic | ICD-10-CM

## 2019-09-18 DIAGNOSIS — Z95.1 PRESENCE OF AORTOCORONARY BYPASS GRAFT: Chronic | ICD-10-CM

## 2019-09-18 DIAGNOSIS — Z95.0 PRESENCE OF CARDIAC PACEMAKER: Chronic | ICD-10-CM

## 2019-09-18 LAB
ALBUMIN SERPL ELPH-MCNC: 3.3 G/DL — SIGNIFICANT CHANGE UP (ref 3.3–5)
ALP SERPL-CCNC: 100 U/L — SIGNIFICANT CHANGE UP (ref 40–120)
ALT FLD-CCNC: 18 U/L — SIGNIFICANT CHANGE UP (ref 12–78)
ANION GAP SERPL CALC-SCNC: 7 MMOL/L — SIGNIFICANT CHANGE UP (ref 5–17)
APPEARANCE UR: CLEAR — SIGNIFICANT CHANGE UP
APTT BLD: 33.8 SEC — SIGNIFICANT CHANGE UP (ref 28.5–37)
AST SERPL-CCNC: 14 U/L — LOW (ref 15–37)
BASOPHILS # BLD AUTO: 0.09 K/UL — SIGNIFICANT CHANGE UP (ref 0–0.2)
BASOPHILS NFR BLD AUTO: 1 % — SIGNIFICANT CHANGE UP (ref 0–2)
BILIRUB SERPL-MCNC: 0.2 MG/DL — SIGNIFICANT CHANGE UP (ref 0.2–1.2)
BILIRUB UR-MCNC: NEGATIVE — SIGNIFICANT CHANGE UP
BUN SERPL-MCNC: 25 MG/DL — HIGH (ref 7–23)
CALCIUM SERPL-MCNC: 8.6 MG/DL — SIGNIFICANT CHANGE UP (ref 8.5–10.1)
CHLORIDE SERPL-SCNC: 104 MMOL/L — SIGNIFICANT CHANGE UP (ref 96–108)
CO2 SERPL-SCNC: 29 MMOL/L — SIGNIFICANT CHANGE UP (ref 22–31)
COLOR SPEC: YELLOW — SIGNIFICANT CHANGE UP
CREAT SERPL-MCNC: 0.93 MG/DL — SIGNIFICANT CHANGE UP (ref 0.5–1.3)
DIFF PNL FLD: ABNORMAL
EOSINOPHIL # BLD AUTO: 0.32 K/UL — SIGNIFICANT CHANGE UP (ref 0–0.5)
EOSINOPHIL NFR BLD AUTO: 3.7 % — SIGNIFICANT CHANGE UP (ref 0–6)
GLUCOSE SERPL-MCNC: 107 MG/DL — HIGH (ref 70–99)
GLUCOSE UR QL: NEGATIVE — SIGNIFICANT CHANGE UP
HCT VFR BLD CALC: 36.4 % — SIGNIFICANT CHANGE UP (ref 34.5–45)
HGB BLD-MCNC: 11.7 G/DL — SIGNIFICANT CHANGE UP (ref 11.5–15.5)
IMM GRANULOCYTES NFR BLD AUTO: 0.2 % — SIGNIFICANT CHANGE UP (ref 0–1.5)
INR BLD: 1.2 RATIO — HIGH (ref 0.88–1.16)
KETONES UR-MCNC: NEGATIVE — SIGNIFICANT CHANGE UP
LEUKOCYTE ESTERASE UR-ACNC: ABNORMAL
LYMPHOCYTES # BLD AUTO: 2.08 K/UL — SIGNIFICANT CHANGE UP (ref 1–3.3)
LYMPHOCYTES # BLD AUTO: 23.9 % — SIGNIFICANT CHANGE UP (ref 13–44)
MCHC RBC-ENTMCNC: 28.5 PG — SIGNIFICANT CHANGE UP (ref 27–34)
MCHC RBC-ENTMCNC: 32.1 GM/DL — SIGNIFICANT CHANGE UP (ref 32–36)
MCV RBC AUTO: 88.6 FL — SIGNIFICANT CHANGE UP (ref 80–100)
MONOCYTES # BLD AUTO: 0.69 K/UL — SIGNIFICANT CHANGE UP (ref 0–0.9)
MONOCYTES NFR BLD AUTO: 7.9 % — SIGNIFICANT CHANGE UP (ref 2–14)
NEUTROPHILS # BLD AUTO: 5.51 K/UL — SIGNIFICANT CHANGE UP (ref 1.8–7.4)
NEUTROPHILS NFR BLD AUTO: 63.3 % — SIGNIFICANT CHANGE UP (ref 43–77)
NITRITE UR-MCNC: NEGATIVE — SIGNIFICANT CHANGE UP
NRBC # BLD: 0 /100 WBCS — SIGNIFICANT CHANGE UP (ref 0–0)
PH UR: 6 — SIGNIFICANT CHANGE UP (ref 5–8)
PLATELET # BLD AUTO: 237 K/UL — SIGNIFICANT CHANGE UP (ref 150–400)
POTASSIUM SERPL-MCNC: 3.4 MMOL/L — LOW (ref 3.5–5.3)
POTASSIUM SERPL-SCNC: 3.4 MMOL/L — LOW (ref 3.5–5.3)
PROT SERPL-MCNC: 7.5 G/DL — SIGNIFICANT CHANGE UP (ref 6–8.3)
PROT UR-MCNC: 25 MG/DL
PROTHROM AB SERPL-ACNC: 13.8 SEC — HIGH (ref 10–12.9)
RBC # BLD: 4.11 M/UL — SIGNIFICANT CHANGE UP (ref 3.8–5.2)
RBC # FLD: 13.3 % — SIGNIFICANT CHANGE UP (ref 10.3–14.5)
SODIUM SERPL-SCNC: 140 MMOL/L — SIGNIFICANT CHANGE UP (ref 135–145)
SP GR SPEC: 1.01 — SIGNIFICANT CHANGE UP (ref 1.01–1.02)
TROPONIN I SERPL-MCNC: <.015 NG/ML — SIGNIFICANT CHANGE UP (ref 0.01–0.04)
UROBILINOGEN FLD QL: NEGATIVE — SIGNIFICANT CHANGE UP
WBC # BLD: 8.71 K/UL — SIGNIFICANT CHANGE UP (ref 3.8–10.5)
WBC # FLD AUTO: 8.71 K/UL — SIGNIFICANT CHANGE UP (ref 3.8–10.5)

## 2019-09-18 PROCEDURE — 71045 X-RAY EXAM CHEST 1 VIEW: CPT

## 2019-09-18 PROCEDURE — 70450 CT HEAD/BRAIN W/O DYE: CPT

## 2019-09-18 PROCEDURE — 85027 COMPLETE CBC AUTOMATED: CPT

## 2019-09-18 PROCEDURE — 81001 URINALYSIS AUTO W/SCOPE: CPT

## 2019-09-18 PROCEDURE — 70450 CT HEAD/BRAIN W/O DYE: CPT | Mod: 26

## 2019-09-18 PROCEDURE — 36415 COLL VENOUS BLD VENIPUNCTURE: CPT

## 2019-09-18 PROCEDURE — 93005 ELECTROCARDIOGRAM TRACING: CPT

## 2019-09-18 PROCEDURE — 85610 PROTHROMBIN TIME: CPT

## 2019-09-18 PROCEDURE — 85730 THROMBOPLASTIN TIME PARTIAL: CPT

## 2019-09-18 PROCEDURE — 84484 ASSAY OF TROPONIN QUANT: CPT

## 2019-09-18 PROCEDURE — 80053 COMPREHEN METABOLIC PANEL: CPT

## 2019-09-18 PROCEDURE — 71045 X-RAY EXAM CHEST 1 VIEW: CPT | Mod: 26

## 2019-09-18 PROCEDURE — 99284 EMERGENCY DEPT VISIT MOD MDM: CPT | Mod: 25

## 2019-09-18 PROCEDURE — 99284 EMERGENCY DEPT VISIT MOD MDM: CPT

## 2019-09-18 PROCEDURE — 93010 ELECTROCARDIOGRAM REPORT: CPT

## 2019-09-18 RX ORDER — SODIUM CHLORIDE 9 MG/ML
1000 INJECTION INTRAMUSCULAR; INTRAVENOUS; SUBCUTANEOUS ONCE
Refills: 0 | Status: COMPLETED | OUTPATIENT
Start: 2019-09-18 | End: 2019-09-18

## 2019-09-18 RX ORDER — DAPTOMYCIN 500 MG/10ML
550 INJECTION, POWDER, LYOPHILIZED, FOR SOLUTION INTRAVENOUS ONCE
Refills: 0 | Status: DISCONTINUED | OUTPATIENT
Start: 2019-09-18 | End: 2019-09-18

## 2019-09-18 RX ORDER — KETOROLAC TROMETHAMINE 30 MG/ML
15 SYRINGE (ML) INJECTION ONCE
Refills: 0 | Status: DISCONTINUED | OUTPATIENT
Start: 2019-09-18 | End: 2019-09-18

## 2019-09-18 RX ADMIN — SODIUM CHLORIDE 1000 MILLILITER(S): 9 INJECTION INTRAMUSCULAR; INTRAVENOUS; SUBCUTANEOUS at 20:46

## 2019-09-18 NOTE — ED PROVIDER NOTE - ATTENDING CONTRIBUTION TO CARE
Pt seen and examined and d/w PA.  agree with a and p.  pt is a 89 yo female on eloquis  with copd sent in from nursing facility for ams and confusion today. pt with dementia at baseline. pt on exam in ed alert and oriented x 1, no distress, lungs cta,  abd benign, no fever, no rash.  pt with ct head neg, labs wnl, no signs infection.  ivf, pt ok for d/c back to nursing home and fu pmd 1-2 days

## 2019-09-18 NOTE — ED ADULT NURSE NOTE - NSIMPLEMENTINTERV_GEN_ALL_ED
Implemented All Fall with Harm Risk Interventions:  [place] to call system. Call bell, personal items and telephone within reach. Instruct patient to call for assistance. Room bathroom lighting operational. Non-slip footwear when patient is off stretcher. Physically safe environment: no spills, clutter or unnecessary equipment. Stretcher in lowest position, wheels locked, appropriate side rails in place. Provide visual cue, wrist band, yellow gown, etc. Monitor gait and stability. Monitor for mental status changes and reorient to person, place, and time. Review medications for side effects contributing to fall risk. Reinforce activity limits and safety measures with patient and family. Provide visual clues: red socks.

## 2019-09-18 NOTE — ED ADULT NURSE NOTE - NSFALLRSKASSESASSIST_ED_ALL_ED
yes

## 2019-09-18 NOTE — ED PROVIDER NOTE - PATIENT PORTAL LINK FT
You can access the FollowMyHealth Patient Portal offered by [facility] by registering at the following website: [email or web address]. By joining [employer]’s FollowMyHealth portal, you will also be able to view your health information using other applications (apps) compatible with our system.

## 2019-09-18 NOTE — ED ADULT NURSE NOTE - OBJECTIVE STATEMENT
89 y/o female from [facility] with pmh of depression and dementia. as per ems, pt had episode of AMS and was not acting herself. she denies any active complaints in the er. the patient is awake and alert. she denies nausea vomiting fever chills chest pain sob headache abdominal pain and dysuria.

## 2019-09-18 NOTE — ED PROVIDER NOTE - NSFOLLOWUPINSTRUCTIONS_ED_ALL_ED_FT
Follow up with her primary doctor 1-2 days. Have her return to the ED for new or concerning symptoms.

## 2019-09-18 NOTE — ED PROVIDER NOTE - PROGRESS NOTE DETAILS
Pt remains stable. Workup unremarkable. Pt to be sent back to [place]. Spoke with PANDA Montes De Oca from facility. No emergent concerns at this time. Pt stable for transport once EMS arrives.

## 2019-09-25 NOTE — ED PROVIDER NOTE - PSH
Artificial pacemaker  [employer] 2001, replaced 2005  model [id number]  Colon polyp  2010  S/P CABG x 3 5

## 2021-03-10 NOTE — DISCHARGE NOTE ADULT - NS MD DC FALL RISK RISK
The patient is a 89y Female complaining of swelling of lower extremities.
For information on Fall & Injury Prevention, visit [email or web address]

## 2021-06-10 NOTE — ED ADULT NURSE NOTE - BREATHING, MLM
Denies recent international travel, recent illness and sick contact with COVID in the last 3 weeks Spontaneous, unlabored and symmetrical

## 2022-08-09 NOTE — ED ADULT NURSE NOTE - NS ED NURSE RECORD ANOTHER HT AND WT
Quality 111:Pneumonia Vaccination Status For Older Adults: Pneumococcal vaccine was not administered on or after patient’s 60th birthday and before the end of the measurement period, reason not otherwise specified Quality 130: Documentation Of Current Medications In The Medical Record: Current Medications Documented Detail Level: Detailed Yes Quality 110: Preventive Care And Screening: Influenza Immunization: Influenza Immunization not Administered for Documented Reasons. Quality 265: Biopsy Follow-Up: Biopsy results reviewed, communicated, tracked, and documented Quality 431: Preventive Care And Screening: Unhealthy Alcohol Use - Screening: Patient not identified as an unhealthy alcohol user when screened for unhealthy alcohol use using a systematic screening method Quality 128: Preventive Care And Screening: Body Mass Index (Bmi) Screening And Follow-Up Plan: BMI is documented above normal parameters and a follow-up plan is documented Quality 402: Tobacco Use And Help With Quitting Among Adolescents: Patient screened for tobacco and never smoked

## 2024-03-18 NOTE — DISCHARGE NOTE ADULT - FUNCTIONAL SCREEN CURRENT LEVEL: AMBULATION, MLM
(3) assistive equipment and person Mildly to Moderately Impaired: difficulty hearing in some environments or speaker may need to increase volume or speak distinctly

## 2024-05-01 NOTE — ED ADULT NURSE NOTE - PRO INTERPRETER NEED 2
"Chief Complaint  Follow-up (Following up on lip filler from 4/18/24)    Subjective  left upper lip need more filler to look like the right side          History of Present Illness  Faina Braun is a 54 y.o. female who presents to [facility] as a follow up for derma filler.    She complains of uneven lip filler.  She does not have a history of Accutane use.  She does not have a history of cold sores.    Allergies: Ct contrast, Iodinated contrast media, and Sulfamethoxazole-trimethoprim  Allergies Reconciled.    Review of Systems  All system were reviewed and were negative, except the ones noted above.     Review of Systems   Constitutional: Negative.    HENT: Negative.     Eyes: Negative.    Respiratory: Negative.     Cardiovascular: Negative.    Gastrointestinal: Negative.    Endocrine: Negative.    Genitourinary: Negative.    Musculoskeletal: Negative.    Skin: Negative.    Allergic/Immunologic: Negative.    Neurological: Negative.    Hematological: Negative.    Psychiatric/Behavioral: Negative.          Objective     /71 (BP Location: Right arm, Patient Position: Sitting)   Pulse 80   Temp 98.4 °F (36.9 °C) (Temporal)   Ht 160 cm (63\")   Wt 82.6 kg (182 lb)   SpO2 96%   BMI 32.24 kg/m²     Body mass index is 32.24 kg/m².    Physical Exam   Vitals reviewed.  Constitutional  She appears well-developed and well-nourished.     Eyes  System normal.       Cardiovascular: Normal rate.     Pulmonary/Chest  Effort normal.     Skin  Skin is warm and dry.     Psychiatric  She has a normal mood and affect.     Face  Her facial skeleton is not symmetrical. She shows laxity skin tone. She has rhytids that are deep, are fine, have prominent marionette lines, have prominent nasolabial folds, are present at rest and are present with animation.. Her skin is a III on the Gaviria scale.She has facial weakness in the following areas: none on the right, and none " on the left.     Face comments: Lip filler placed 2 weeks ago, focused on right upper lip as was smaller in size and volume when compared to left. After swelling in lips resolved the left upper lip was noticeably smaller than right and she wants more  left lip. She states she is very happy with appearance of lips otherwise.  Discussed left upper lip would not a full syringe of Ultra and she wished to place remainder in left nasal labial fold and left marionette line.     Forehead and Brow  Forehead skin is laxity. She is positive for rhytids.       Cardiovascular: Normal rate    Pulmonary/Chest: Effort normal    Head and Face: No open areas, facial skin intact,  mid face volume loss, moderately deep nasal labial folds, marionettes, and vertical lip lines; decreased upper lip to lower lip ratio         Result Review :     Procedures  Photos were obtained.    Procedure Description:   Filler Injection: The indications, benefits, risks, alternatives, expected outcomes, and complications as to the injection of Select Filler: Ultra was discussed with the patient.  Informed consent was obtained.  They understand, accept risks, consent and wish to proceed. After this time the area was prepped with alcohol.  She did not opt for nerve block this visit. I  then proceeded to inject the left marionette lines, left upper lip, and left nasolabial fold with filler, taking care to aspirate prior to injection and injected in small aliquots. Patient tolerated procedure well with no immediate complications. Postoperative instructions were given. The patient will follow-up in as needed.             Assessment and Plan      Diagnoses and all orders for this visit:    1. Facial aging (Primary)        Plan:  Patient received 0.3mL to the left upper lip, 0.3mL to the left marionette line, and 0.4mL to the left nasolabial fold  She has know asymmetry to her upper lip with a circular fold in left upper lip and alters placement of  filler, she reported being happy with results through and at completion of injection  Patient given post procedure instructions and verbalized understanding.  Areas were massaged with Arnicare, minimal swelling and no bleeding noted after injection.   Patient states she stopped smoking 2 weeks ago with last filler injection    I spent 30 minutes caring for Faina on this date of service. This time includes time spent by me in the following activities:    Follow Up     RTC 12 weeks from last Botox injection or call in 2 weeks for evaluation of filler    Patient was given instructions and counseling regarding her condition. Please see specific information pulled into the AVS if appropriate.     Scribed by KARIN Epstein, acting as a scribe for DUDLEY Gonzalez, 05/03/24 10:20 EDT.  DUDLEY Gonzalez's signature on the note affirms that the note adequately documents the care provided.       English

## 2024-05-06 NOTE — ED ADULT NURSE NOTE - EENT WDL
Eyes with no visual disturbances.  Ears clean and dry and no hearing difficulties. Nose with pink mucosa and no drainage.  Mouth mucous membranes moist and pink.  No tenderness or swelling to throat or neck.
03-Oct-2023

## 2024-11-12 NOTE — ED PROVIDER NOTE - OBJECTIVE STATEMENT
PATIENT INSTRUCTIONS    Thank you for seeing me today, it was a pleasure taking care of you.  Please check out at the  and schedule a follow up appointment.  Return in about 1 year (around 11/12/2025) for physical.  Please remember that the preferred debbie period for appointments is 5 minutes. This is to help maximize the amount of time that we can spend together at our visits.    Please get your labs drawn - you may need to schedule a lab appointment if this was not completed at your recent doctor's visit.  The following imaging studies were ordered: None  Please also follow up with the following specialists: Psychiatry, dermatology  Please fill out the advance directive information (power of  documents) and bring a copy to our clinic.  For your acne, start doxycycline 100 mg twice daily - antibiotic  Can use over the counter adapalene. Start benzoyl peroxide gel for acne.  For new rash, start ketoconazole shampoo      Dr. Britton Isaacs       89 yo female with HLD triple bypass dementia asthma HTN on elloquis presenting after unwitnessed fall last night at 10 pm at the [facility] (assisted living).  patient was seen getting up from the incident unassisted.  complaining of left foot and rib pain s/p fall.  denies hitting her head although is unsure and states that she was off balance and fell backwards.  describes back pain as constant achy, worse with palpation, did not take anything for her pain and denies any radiation.  denies headache, n/v/d.

## 2025-04-28 NOTE — PATIENT PROFILE ADULT. - FUNCTIONAL SCREEN CURRENT LEVEL: TOILETING, MLM
(3) assistive equipment and person Provide nutrition/hydration as medically appropriate; as tolerated & within pt/family's wishes for tx

## 2025-05-05 NOTE — ED ADULT NURSE NOTE - CAS DISCH TRANSFER METHOD
[FreeTextEntry1] : #Pre-op clearance - pt advised to hold chlorthalidone morning of surgery - ekg today - pt had recent blood work done, nothing abnormal on bmp Private car
